# Patient Record
Sex: MALE | Race: WHITE | NOT HISPANIC OR LATINO | Employment: OTHER | URBAN - METROPOLITAN AREA
[De-identification: names, ages, dates, MRNs, and addresses within clinical notes are randomized per-mention and may not be internally consistent; named-entity substitution may affect disease eponyms.]

---

## 2024-06-13 ENCOUNTER — OFFICE VISIT (OUTPATIENT)
Dept: UROLOGY | Facility: CLINIC | Age: 69
End: 2024-06-13
Payer: COMMERCIAL

## 2024-06-13 VITALS
WEIGHT: 167 LBS | DIASTOLIC BLOOD PRESSURE: 84 MMHG | BODY MASS INDEX: 24.73 KG/M2 | HEART RATE: 53 BPM | OXYGEN SATURATION: 98 % | SYSTOLIC BLOOD PRESSURE: 149 MMHG | HEIGHT: 69 IN

## 2024-06-13 DIAGNOSIS — C61 PROSTATE CANCER (H): Primary | ICD-10-CM

## 2024-06-13 PROBLEM — N52.9 ERECTILE DYSFUNCTION: Status: ACTIVE | Noted: 2022-03-07

## 2024-06-13 PROBLEM — E78.5 HYPERLIPIDEMIA: Status: ACTIVE | Noted: 2022-03-07

## 2024-06-13 PROCEDURE — 99205 OFFICE O/P NEW HI 60 MIN: CPT | Performed by: UROLOGY

## 2024-06-13 RX ORDER — SILDENAFIL CITRATE 20 MG/1
TABLET ORAL
COMMUNITY
Start: 2023-12-05 | End: 2024-09-16

## 2024-06-13 RX ORDER — HEPARIN SODIUM 10000 [USP'U]/ML
5000 INJECTION, SOLUTION INTRAVENOUS; SUBCUTANEOUS
Status: CANCELLED | OUTPATIENT
Start: 2024-06-13

## 2024-06-13 RX ORDER — ATORVASTATIN CALCIUM 10 MG/1
10 TABLET, FILM COATED ORAL DAILY
COMMUNITY
Start: 2024-01-18 | End: 2025-01-17

## 2024-06-13 ASSESSMENT — PAIN SCALES - GENERAL: PAINLEVEL: NO PAIN (0)

## 2024-06-13 NOTE — LETTER
6/13/2024       RE: Jeff Maddox  [address]  [place] [address]     Dear Colleague,    Thank you for referring your patient, Jeff Maddox, to the [facility] at [facility]. Please see a copy of my visit note below.          Chief Complaint:   Prostate Cancer         History of Present Illness:    Jeff Maddox is a very pleasant 68 year old male who presents with a history of Prostate Cancer. He was diagnosed based on the finding of a suspicious MENDY. PSA actually quite low at 0.8. PIRADS 4 lesion on MRI and Fatuma 4+3=7 disease on biopsy. PSMA PET completed and identifies no evidence of metastasis. He presents today to discuss these findings and options for treatment. Has prescription for sildenafil, but has adequate erectile function. He notes he has no significant urinary symptoms.    PSA 0.8    Fusion Biopsy 4/30/2024  FINAL DIAGNOSIS    A.  Prostate, right apex, x 2, needle core biopsy:  Prostate tissue with no diagnostic abnormality     B.  Prostate, right middle, x 2, needle core biopsy:  Prostatic adenocarcinoma, GRADE GROUP 2, Fatuma grade 3+4 (score 7) (estimated 30 % grade 4)  1 of 2 needle cores are positive  5 % tissue involvement     C.  Prostate, right base, x 1, needle core biopsy:  Prostatic adenocarcinoma, GRADE GROUP 3, [place] grade 4+3 (score 7) (estimated 70 % grade 4)  1 of 1 needle cores are positive  30 % tissue involvement     D.  Prostate, left apex, x 2, needle core biopsy:  Atypical small acinar proliferation (ASAP)     E.  Prostate, left middle, x 2, needle core biopsy:  Prostatic adenocarcinoma, GRADE GROUP 1, Holland grade 3+3 (score 6)  1 of 2 needle cores are positive  < 5 % tissue involvement     F.  Prostate, left base, x 1, needle core biopsy:  Prostatic adenocarcinoma, GRADE GROUP 3, Fatuma grade 4+3 (score 7) (estimate 70% grade 4)  1 of 1 needle cores are positive  80 % tissue  involvement     G.  Prostate, Index Lesion x 4 Midline PZ base post, needle core biopsy:  Prostatic adenocarcinoma, GRADE GROUP 3, [place] grade 4+3 (score 7) (estimated 80 % grade 4)  4 of 4 needle cores are positive  90 % tissue involvement  Perineural invasion present     H.  Prostate, Right Diego Ves x 1, needle core biopsy:  Negative for adenocarcinoma in this sample     I.  Prostate, Left Diego Ves x 1, needle core biopsy:  Negative for adenocarcinoma in this sample     MRI Prostate 3/5/2024  PROSTATE VOLUME: 21 mL.   IMPRESSION:   1. Based on the most suspicious finding, this examination is characterized as PIRADS 4 high probability. Clinically significant cancer is likely related present. There is a 1.4 cm nodular focus of signal abnormality, enhancement, diffusion restriction along the posterior midline peripheral zone base at the inferior aspect of the seminal vesicles. This causes broad segment posterior capsular abutment and capsular bulging, but no alban nodular extraprostatic extension is identified.   2. No suspicious adenopathy or bony lesion in pelvis.     PSMA PET 5/23/2024  IMPRESSION:   1. Mild focal uptake in the prostate gland compatible with known malignancy. Relatively low level radiotracer uptake in the primary malignancy may decrease sensitivity for metastatic disease.     2. No scintigraphic evidence for extraprostatic disease.          Past Medical History:   HLD  Prostate Cancer  Erectile Dysfunction         Past Surgical History:   None         Medications     Current Outpatient Medications   Medication Sig Dispense Refill    atorvastatin (LIPITOR) 10 MG tablet Take 10 mg by mouth daily (Patient not taking: Reported on 6/13/2024)      sildenafil (REVATIO) 20 MG tablet TAKE 1 TO 5 TABLETS BY MOUTH ONCE DAILY AS NEEDED. (Patient not taking: Reported on 6/13/2024)      sildenafil (REVATIO) TABS cap/tab  (Patient not taking: Reported on 6/13/2024)       No current facility-administered  "medications for this visit.          Social History:     Social History     Socioeconomic History    Marital status:      Spouse name: Not on file    Number of children: Not on file    Years of education: Not on file    Highest education level: Not on file   Occupational History    Not on file   Tobacco Use    Smoking status: Not on file    Smokeless tobacco: Not on file   Substance and Sexual Activity    Alcohol use: Not on file    Drug use: Not on file    Sexual activity: Not on file   Other Topics Concern    Not on file   Social History Narrative    Not on file     Social Determinants of Health     Financial Resource Strain: Not on file   Food Insecurity: Not on file   Transportation Needs: Not on file   Physical Activity: Not on file   Stress: Not on file   Social Connections: Not on file   Interpersonal Safety: Not on file   Housing Stability: Not on file          Allergies:   Patient has no known allergies.         Review of Systems:  From intake questionnaire     Skin: negative  Eyes: negative  Ears/Nose/Throat: negative  Respiratory: No shortness of breath, dyspnea on exertion, cough, or hemoptysis  Cardiovascular: No chest pain or palpitations  Gastrointestinal: negative; no nausea/vomiting, constipation or diarrhea  Genitourinary: as per HPI  Musculoskeletal: negative  Neurologic: negative  Psychiatric: negative  Hematologic/Lymphatic/Immunologic: negative  Endocrine: negative         Physical Exam:     Patient is a 68 year old  male   Vitals: Blood pressure (!) 149/84, pulse 53, height 1.753 m (5' 9\"), weight 75.8 kg (167 lb), SpO2 98%.  Constitutional: Body mass index is 24.66 kg/m .  Alert, no acute distress, oriented, conversant  Eyes: no scleral icterus; extraocular muscles intact, moist conjunctivae  Respiratory: no respiratory distress, or pursed lip breathing  Cardiovascular: pulses strong and intact; no obvious jugular venous distension present  Gastrointestinal: soft, nontender, no " organomegaly or masses,   Musculoskeletal: extremities normal, no peripheral edema  Skin: no suspicious lesions or rashes  Neuro: Alert, oriented, speech and mentation normal  Psych: affect and mood normal, alert and oriented to person, place and time      Labs and Pathology:    I reviewed all applicable laboratory and pathology data and went over findings with patient  PSA 0.8  Pathology Fatuma 4+3=7      Imaging:    The following imaging exams were independently viewed and interpreted by me and discussed with patient:  MRI PIRADS 4 - 21 g prostate  PSMA PET with no metastasis      Outside and Past Medical records:    Review of prior external note(s) from - [facility] information from [employer]  reviewed  Review of the result(s) of each unique test - MRI, PSA, pathology, PSMA PET         Assessment and Plan:     68 year old male with Fatuma 4+3=7 prostate cancer. PSA 0.8, nodule on MENDY. MRI PIRADS 4. PSMA PET with no metastasis. We had an extensive discussion about the significance of localized, prostate cancer. We discussed the options for treatment of a localized prostate cancer including active surveillance, brachytherapy, external beam radiation therapy, and surgical removal. We discussed that based on his Fatuma score he would not be an ideal candidate for active surveillance.       We discussed the relative merits of robotic assisted radical prostatectomy. We also discussed the advantages and disadvantages and roles of open surgery vs. laparoscopic (and Da Kimberley assisted) surgery. The anticipated post-operative course was explained, including an anticipated 1-2 day hospital stay. Catheter will remain in place 7-10 days.      We discussed that there was no clear evidence of advantage between surgery and radiation with regard to risk of recurrence. Regarding radiation, we discussed risks including but not limited to cancer recurrence, exacerbation of voiding symptoms or hematuria, urinary  retention, incontinence, stricture of the urinary tract, induction of second malignancy, and risks of rectal symptoms or bleeding.  We discussed fact that rectal symptoms may be more common with radiation than with other treatment modalities. With radiation therapy, we also discussed the difficulties in diagnosing recurrent disease at an early stage due to variability in PSA levels and the fact that most patients are not candidates for local salvage therapy when biochemical recurrence is declared.  We also discussed the significant morbidity of post-radiation local salvage therapy in terms of perioperative complications, erectile dysfunction and urinary incontinence. With surgery, we also discussed the potential advantage over radiation therapy in that biochemical recurrence can be detected at a relatively earlier stage and that salvage radiotherapy is successful in controlling recurrent disease in a substantial proportion of patients.  We also discussed that salvage radiotherapy was associated with a considerably more favorable morbidity profile compared to local salvage therapies for recurrent disease post-radiation.     We discussed option for further discussion with radiation oncology and he has an appointment for this later this afternoon.      The risks, benefits, alternatives, and personnel involved with robotic prostatectomy were discussed in detail. Specifically, we discussed that risks include but are not limited to anesthetic complications including stroke, MI, DVT/PE, as well as risk of bleeding requiring transfusion, bowel injury, infection, lymphocele, ureteral injury, nerve injury,urine leak and other potentially unforseen complications. Also discussed the risk of bladder neck contracture and other urinary symptoms after procedure. In addition, we discussed risk long-term of urinary incontinence and erectile dysfunction following the procedure. Finally, we discussed risk for adverse pathologic  features, including positive surgical margins and potential for post-surgical radiation, hormone ablation and long-term need for PSA monitoring.  All questions were answered in detail.  A written informed consent will be finalized on the morning of the procedure.    Plan:  Schedule robotic-assisted laparoscopic radical prostatectomy with pelvic lymphadenectomy    Orders  Orders Placed This Encounter   Procedures    Case Request: ROBOTIC-ASSISTED LAPAROSCOPIC RADICAL PROSTATECTOMY WITH PELVIC LYMPHADENECTOMY     60 total minutes spent on the date of the encounter including direct interaction with the patient, performing chart review, documentation and further activities as noted above.    Rohan Billy MD  Urology  [facility]

## 2024-06-13 NOTE — NURSING NOTE
Chief Complaint   Patient presents with    Prostate Cancer    Once a night to urinate   Good flow   No leakage   No urgency to urinate   Yolanda Bran, CMA

## 2024-06-13 NOTE — PROGRESS NOTES
Chief Complaint:   Prostate Cancer         History of Present Illness:    Jeff Maddox is a very pleasant 68 year old male who presents with a history of Prostate Cancer. He was diagnosed based on the finding of a suspicious MENDY. PSA actually quite low at 0.8. PIRADS 4 lesion on MRI and Fatuma 4+3=7 disease on biopsy. PSMA PET completed and identifies no evidence of metastasis. He presents today to discuss these findings and options for treatment. Has prescription for sildenafil, but has adequate erectile function. He notes he has no significant urinary symptoms.    PSA 0.8    Fusion Biopsy 4/30/2024  FINAL DIAGNOSIS    A.  Prostate, right apex, x 2, needle core biopsy:  Prostate tissue with no diagnostic abnormality     B.  Prostate, right middle, x 2, needle core biopsy:  Prostatic adenocarcinoma, GRADE GROUP 2, Fatuma grade 3+4 (score 7) (estimated 30 % grade 4)  1 of 2 needle cores are positive  5 % tissue involvement     C.  Prostate, right base, x 1, needle core biopsy:  Prostatic adenocarcinoma, GRADE GROUP 3, Fatuma grade 4+3 (score 7) (estimated 70 % grade 4)  1 of 1 needle cores are positive  30 % tissue involvement     D.  Prostate, left apex, x 2, needle core biopsy:  Atypical small acinar proliferation (ASAP)     E.  Prostate, left middle, x 2, needle core biopsy:  Prostatic adenocarcinoma, GRADE GROUP 1, [place] grade 3+3 (score 6)  1 of 2 needle cores are positive  < 5 % tissue involvement     F.  Prostate, left base, x 1, needle core biopsy:  Prostatic adenocarcinoma, GRADE GROUP 3, [place] grade 4+3 (score 7) (estimate 70% grade 4)  1 of 1 needle cores are positive  80 % tissue involvement     G.  Prostate, Index Lesion x 4 Midline PZ base post, needle core biopsy:  Prostatic adenocarcinoma, GRADE GROUP 3, [place] grade 4+3 (score 7) (estimated 80 % grade 4)  4 of 4 needle cores are positive  90 % tissue involvement  Perineural invasion present     H.  Prostate, Right Diego Ves x 1, needle  core biopsy:  Negative for adenocarcinoma in this sample     I.  Prostate, Left Diego Ves x 1, needle core biopsy:  Negative for adenocarcinoma in this sample     MRI Prostate 3/5/2024  PROSTATE VOLUME: 21 mL.   IMPRESSION:   1. Based on the most suspicious finding, this examination is characterized as PIRADS 4 high probability. Clinically significant cancer is likely related present. There is a 1.4 cm nodular focus of signal abnormality, enhancement, diffusion restriction along the posterior midline peripheral zone base at the inferior aspect of the seminal vesicles. This causes broad segment posterior capsular abutment and capsular bulging, but no alban nodular extraprostatic extension is identified.   2. No suspicious adenopathy or bony lesion in pelvis.     PSMA PET 5/23/2024  IMPRESSION:   1. Mild focal uptake in the prostate gland compatible with known malignancy. Relatively low level radiotracer uptake in the primary malignancy may decrease sensitivity for metastatic disease.     2. No scintigraphic evidence for extraprostatic disease.          Past Medical History:   HLD  Prostate Cancer  Erectile Dysfunction         Past Surgical History:   None         Medications     Current Outpatient Medications   Medication Sig Dispense Refill    atorvastatin (LIPITOR) 10 MG tablet Take 10 mg by mouth daily (Patient not taking: Reported on 6/13/2024)      sildenafil (REVATIO) 20 MG tablet TAKE 1 TO 5 TABLETS BY MOUTH ONCE DAILY AS NEEDED. (Patient not taking: Reported on 6/13/2024)      sildenafil (REVATIO) TABS cap/tab  (Patient not taking: Reported on 6/13/2024)       No current facility-administered medications for this visit.          Social History:     Social History     Socioeconomic History    Marital status:      Spouse name: Not on file    Number of children: Not on file    Years of education: Not on file    Highest education level: Not on file   Occupational History    Not on file   Tobacco Use     "Smoking status: Not on file    Smokeless tobacco: Not on file   Substance and Sexual Activity    Alcohol use: Not on file    Drug use: Not on file    Sexual activity: Not on file   Other Topics Concern    Not on file   Social History Narrative    Not on file     Social Determinants of Health     Financial Resource Strain: Not on file   Food Insecurity: Not on file   Transportation Needs: Not on file   Physical Activity: Not on file   Stress: Not on file   Social Connections: Not on file   Interpersonal Safety: Not on file   Housing Stability: Not on file          Allergies:   Patient has no known allergies.         Review of Systems:  From intake questionnaire     Skin: negative  Eyes: negative  Ears/Nose/Throat: negative  Respiratory: No shortness of breath, dyspnea on exertion, cough, or hemoptysis  Cardiovascular: No chest pain or palpitations  Gastrointestinal: negative; no nausea/vomiting, constipation or diarrhea  Genitourinary: as per HPI  Musculoskeletal: negative  Neurologic: negative  Psychiatric: negative  Hematologic/Lymphatic/Immunologic: negative  Endocrine: negative         Physical Exam:     Patient is a 68 year old  male   Vitals: Blood pressure (!) 149/84, pulse 53, height 1.753 m (5' 9\"), weight 75.8 kg (167 lb), SpO2 98%.  Constitutional: Body mass index is 24.66 kg/m .  Alert, no acute distress, oriented, conversant  Eyes: no scleral icterus; extraocular muscles intact, moist conjunctivae  Respiratory: no respiratory distress, or pursed lip breathing  Cardiovascular: pulses strong and intact; no obvious jugular venous distension present  Gastrointestinal: soft, nontender, no organomegaly or masses,   Musculoskeletal: extremities normal, no peripheral edema  Skin: no suspicious lesions or rashes  Neuro: Alert, oriented, speech and mentation normal  Psych: affect and mood normal, alert and oriented to person, place and time      Labs and Pathology:    I reviewed all applicable laboratory and " pathology data and went over findings with patient  PSA 0.8  Pathology [place]ory 4+3=7      Imaging:    The following imaging exams were independently viewed and interpreted by me and discussed with patient:  MRI PIRADS 4 - 21 g prostate  PSMA PET with no metastasis      Outside and Past Medical records:    Review of prior external note(s) from - [facility] information from [facility]  reviewed  Review of the result(s) of each unique test - MRI, PSA, pathology, PSMA PET         Assessment and Plan:     68 year old male with [place]ory 4+3=7 prostate cancer. PSA 0.8, nodule on MENDY. MRI PIRADS 4. PSMA PET with no metastasis. We had an extensive discussion about the significance of localized, prostate cancer. We discussed the options for treatment of a localized prostate cancer including active surveillance, brachytherapy, external beam radiation therapy, and surgical removal. We discussed that based on his Emory score he would not be an ideal candidate for active surveillance.       We discussed the relative merits of robotic assisted radical prostatectomy. We also discussed the advantages and disadvantages and roles of open surgery vs. laparoscopic (and Da Kimberley assisted) surgery. The anticipated post-operative course was explained, including an anticipated 1-2 day hospital stay. Catheter will remain in place 7-10 days.      We discussed that there was no clear evidence of advantage between surgery and radiation with regard to risk of recurrence. Regarding radiation, we discussed risks including but not limited to cancer recurrence, exacerbation of voiding symptoms or hematuria, urinary retention, incontinence, stricture of the urinary tract, induction of second malignancy, and risks of rectal symptoms or bleeding.  We discussed fact that rectal symptoms may be more common with radiation than with other treatment modalities. With radiation therapy, we also discussed the difficulties in diagnosing recurrent  disease at an early stage due to variability in PSA levels and the fact that most patients are not candidates for local salvage therapy when biochemical recurrence is declared.  We also discussed the significant morbidity of post-radiation local salvage therapy in terms of perioperative complications, erectile dysfunction and urinary incontinence. With surgery, we also discussed the potential advantage over radiation therapy in that biochemical recurrence can be detected at a relatively earlier stage and that salvage radiotherapy is successful in controlling recurrent disease in a substantial proportion of patients.  We also discussed that salvage radiotherapy was associated with a considerably more favorable morbidity profile compared to local salvage therapies for recurrent disease post-radiation.     We discussed option for further discussion with radiation oncology and he has an appointment for this later this afternoon.      The risks, benefits, alternatives, and personnel involved with robotic prostatectomy were discussed in detail. Specifically, we discussed that risks include but are not limited to anesthetic complications including stroke, MI, DVT/PE, as well as risk of bleeding requiring transfusion, bowel injury, infection, lymphocele, ureteral injury, nerve injury,urine leak and other potentially unforseen complications. Also discussed the risk of bladder neck contracture and other urinary symptoms after procedure. In addition, we discussed risk long-term of urinary incontinence and erectile dysfunction following the procedure. Finally, we discussed risk for adverse pathologic features, including positive surgical margins and potential for post-surgical radiation, hormone ablation and long-term need for PSA monitoring.  All questions were answered in detail.  A written informed consent will be finalized on the morning of the procedure.    Plan:  Schedule robotic-assisted laparoscopic radical  prostatectomy with pelvic lymphadenectomy    Orders  Orders Placed This Encounter   Procedures    Case Request: ROBOTIC-ASSISTED LAPAROSCOPIC RADICAL PROSTATECTOMY WITH PELVIC LYMPHADENECTOMY     60 total minutes spent on the date of the encounter including direct interaction with the patient, performing chart review, documentation and further activities as noted above.    Rohan Billy MD  Urology  [facility]

## 2024-06-14 ENCOUNTER — TELEPHONE (OUTPATIENT)
Dept: ONCOLOGY | Facility: CLINIC | Age: 69
End: 2024-06-14
Payer: COMMERCIAL

## 2024-06-14 NOTE — TELEPHONE ENCOUNTER
0614  [facility] to sched surg  [facility]  [phone]

## 2024-06-18 ENCOUNTER — TELEPHONE (OUTPATIENT)
Dept: ONCOLOGY | Facility: CLINIC | Age: 69
End: 2024-06-18
Payer: COMMERCIAL

## 2024-06-18 NOTE — TELEPHONE ENCOUNTER
Patient is scheduled for surgery with Dr. Billy    Spoke with: pt    Date of Surgery: 0920    Location: [facility]    Informed patient they will need an adult  y    Pre op with Provider y    H&P: Scheduled with pt to sched    Additional imaging/appointments: n    Surgery packet: I mailed     Additional comments: post op made in [facility] on 6/18/2024 at 8:36 AM

## 2024-06-21 NOTE — TELEPHONE ENCOUNTER
FUTURE VISIT INFORMATION      SURGERY INFORMATION:  Date: 9/20/24  Location:  or  Surgeon:  Rohan Billy MD   Anesthesia Type:  general  Procedure: ROBOTIC-ASSISTED LAPAROSCOPIC RADICAL PROSTATECTOMY WITH PELVIC LYMPHADENECTOMY     RECORDS REQUESTED FROM:       Primary Care Provider: [employer]

## 2024-06-23 ENCOUNTER — HEALTH MAINTENANCE LETTER (OUTPATIENT)
Age: 69
End: 2024-06-23

## 2024-08-29 ENCOUNTER — CARE COORDINATION (OUTPATIENT)
Dept: UROLOGY | Facility: CLINIC | Age: 69
End: 2024-08-29
Payer: COMMERCIAL

## 2024-09-15 LAB
ABO/RH(D): NORMAL
ANTIBODY SCREEN: NEGATIVE
SPECIMEN EXPIRATION DATE: NORMAL

## 2024-09-16 ENCOUNTER — APPOINTMENT (OUTPATIENT)
Dept: LAB | Facility: CLINIC | Age: 69
End: 2024-09-16
Payer: COMMERCIAL

## 2024-09-16 ENCOUNTER — ANESTHESIA EVENT (OUTPATIENT)
Dept: SURGERY | Facility: CLINIC | Age: 69
End: 2024-09-16
Payer: COMMERCIAL

## 2024-09-16 ENCOUNTER — PRE VISIT (OUTPATIENT)
Dept: SURGERY | Facility: CLINIC | Age: 69
End: 2024-09-16

## 2024-09-16 ENCOUNTER — OFFICE VISIT (OUTPATIENT)
Dept: SURGERY | Facility: CLINIC | Age: 69
End: 2024-09-16
Payer: COMMERCIAL

## 2024-09-16 ENCOUNTER — LAB (OUTPATIENT)
Dept: LAB | Facility: CLINIC | Age: 69
End: 2024-09-16
Payer: COMMERCIAL

## 2024-09-16 VITALS
TEMPERATURE: 98.4 F | OXYGEN SATURATION: 97 % | WEIGHT: 174 LBS | DIASTOLIC BLOOD PRESSURE: 71 MMHG | BODY MASS INDEX: 25.77 KG/M2 | SYSTOLIC BLOOD PRESSURE: 112 MMHG | RESPIRATION RATE: 16 BRPM | HEART RATE: 66 BPM | HEIGHT: 69 IN

## 2024-09-16 DIAGNOSIS — C61 PROSTATE CANCER (H): ICD-10-CM

## 2024-09-16 DIAGNOSIS — Z01.818 PRE-OP EVALUATION: ICD-10-CM

## 2024-09-16 DIAGNOSIS — Z01.818 PRE-OP EVALUATION: Primary | ICD-10-CM

## 2024-09-16 LAB
ANION GAP SERPL CALCULATED.3IONS-SCNC: 10 MMOL/L (ref 7–15)
BUN SERPL-MCNC: 13.3 MG/DL (ref 8–23)
CALCIUM SERPL-MCNC: 9.2 MG/DL (ref 8.8–10.4)
CHLORIDE SERPL-SCNC: 104 MMOL/L (ref 98–107)
CREAT SERPL-MCNC: 0.63 MG/DL (ref 0.67–1.17)
EGFRCR SERPLBLD CKD-EPI 2021: >90 ML/MIN/1.73M2
ERYTHROCYTE [DISTWIDTH] IN BLOOD BY AUTOMATED COUNT: 12.4 % (ref 10–15)
GLUCOSE SERPL-MCNC: 99 MG/DL (ref 70–99)
HCO3 SERPL-SCNC: 27 MMOL/L (ref 22–29)
HCT VFR BLD AUTO: 45.2 % (ref 40–53)
HGB BLD-MCNC: 15.1 G/DL (ref 13.3–17.7)
MCH RBC QN AUTO: 31.1 PG (ref 26.5–33)
MCHC RBC AUTO-ENTMCNC: 33.4 G/DL (ref 31.5–36.5)
MCV RBC AUTO: 93 FL (ref 78–100)
PLATELET # BLD AUTO: 255 10E3/UL (ref 150–450)
POTASSIUM SERPL-SCNC: 4.3 MMOL/L (ref 3.4–5.3)
RBC # BLD AUTO: 4.86 10E6/UL (ref 4.4–5.9)
SODIUM SERPL-SCNC: 141 MMOL/L (ref 135–145)
WBC # BLD AUTO: 6.1 10E3/UL (ref 4–11)

## 2024-09-16 PROCEDURE — 85027 COMPLETE CBC AUTOMATED: CPT | Performed by: PATHOLOGY

## 2024-09-16 PROCEDURE — 86900 BLOOD TYPING SEROLOGIC ABO: CPT

## 2024-09-16 PROCEDURE — 99203 OFFICE O/P NEW LOW 30 MIN: CPT | Performed by: PHYSICIAN ASSISTANT

## 2024-09-16 PROCEDURE — 80048 BASIC METABOLIC PNL TOTAL CA: CPT | Performed by: PATHOLOGY

## 2024-09-16 PROCEDURE — 36415 COLL VENOUS BLD VENIPUNCTURE: CPT | Performed by: PATHOLOGY

## 2024-09-16 RX ORDER — CYANOCOBALAMIN (VITAMIN B-12) 500 MCG
400 LOZENGE ORAL EVERY EVENING
COMMUNITY

## 2024-09-16 RX ORDER — MULTIVIT WITH MINERALS/LUTEIN
250 TABLET ORAL EVERY MORNING
COMMUNITY

## 2024-09-16 RX ORDER — VITAMIN B COMPLEX
1 TABLET ORAL EVERY MORNING
COMMUNITY

## 2024-09-16 ASSESSMENT — PAIN SCALES - GENERAL: PAINLEVEL: NO PAIN (0)

## 2024-09-16 ASSESSMENT — ENCOUNTER SYMPTOMS: DYSRHYTHMIAS: 0

## 2024-09-16 NOTE — PATIENT INSTRUCTIONS
Name:  Jeff Maddox   MRN:  [id number]   :  1955   Today's Date:  2024         You were seen today for a pre-operative assessment in the:    Pre-operative Anesthesia Assessment Center(PAC)  [facility]  [address] [place] [id number]  phone [phone]      You will be receiving a call with location, date, arrival time and diet instructions from Preadmission Nursing at your surgical site:    -[facility]: [phone]        Anesthesia recommendations for medications:    Hold Herbal medications and Supplements for the next few days before procedure. Vitamin E    Hold Ibuprofen for 1 day before procedure.   Hold Naproxen for 4 days before procedure.     No alcohol or cannabis products for 24 hours before your procedure       Do not use Sildenafil (Revatio) for 3 days before surgery.    Please DO NOT take the following medications the day of procedure:    Vitamin C and D      OK to take Acetaminophen (Tylenol) as needed        How do I prepare myself?  - Please take 2 showers (one the night prior to surgery and one the morning of surgery) using Scrubcare or Hibiclens soap.    Use this soap only from the neck to your toes.     Leave the soap on your skin for one minute--then rinse thoroughly.      You may use your own shampoo and conditioner. No other hair products.   - Please remove all jewelry and body piercings.  - No lotions, deodorants or fragrance.  - Bring your ID and insurance card.    -If you have a Deep Brain Stimulator, a Spinal Cord Stimulator, or any implanted Neuro Device, you must bring the remote to your appointment                 For further questions regarding your surgery please call your surgeon's office.

## 2024-09-16 NOTE — H&P
Pre-Operative H & P     CC:  Preoperative exam to assess for increased cardiopulmonary risk while undergoing surgery and anesthesia.    Date of Encounter: 9/16/2024  Primary Care Physician:  Romeo Jeter     Reason for visit:   Encounter Diagnoses   Name Primary?    Pre-op evaluation Yes    Prostate cancer (H)        HPI  Jeff Maddox is a 69 year old male who presents for pre-operative H & P in preparation for  Procedure Information       Case: [id number] Date/Time: 09/20/24 0830    Procedure: ROBOTIC-ASSISTED LAPAROSCOPIC RADICAL PROSTATECTOMY WITH PELVIC LYMPHADENECTOMY (Abdomen)    Anesthesia type: General    Diagnosis: Prostate cancer (H) [C61]    Pre-op diagnosis: Prostate cancer (H) [C61]    Location:  OR [address] OR    Providers: Rohan Billy MD            Patient is being evaluated for comorbid conditions of HLD and ED.    He has a history of prostate cancer and was recently evaluated by Dr. Billy in June to discuss further treatment options. He is now scheduled for surgical intervention as above.     History is obtained from the patient and chart review. He is accompanied by his wife today.     Hx of abnormal bleeding or anti-platelet use: Denies      Past Medical History  Past Medical History:   Diagnosis Date    ED (erectile dysfunction)     HLD (hyperlipidemia)     Orchitis, epididymitis, and epididymo-orchitis, with abscess     Prostate cancer (H)        Past Surgical History  Past Surgical History:   Procedure Laterality Date    COLONOSCOPY      DENTAL SURGERY      TONSILLECTOMY      Age 5    VASECTOMY         Prior to Admission Medications  Current Outpatient Medications   Medication Sig Dispense Refill    sildenafil (REVATIO) TABS cap/tab       vitamin C (ASCORBIC ACID) 250 MG tablet Take 250 mg by mouth every morning.      Vitamin D3 (CHOLECALCIFEROL) 25 mcg (1000 units) tablet Take 25 mcg by mouth every morning.      vitamin E 400 units TABS Take 400 Units by mouth every  evening.      atorvastatin (LIPITOR) 10 MG tablet Take 10 mg by mouth daily.         Allergies  No Known Allergies    Social History  Social History     Socioeconomic History    Marital status:      Spouse name: Not on file    Number of children: Not on file    Years of education: Not on file    Highest education level: Not on file   Occupational History    Not on file   Tobacco Use    Smoking status: Never    Smokeless tobacco: Never   Substance and Sexual Activity    Alcohol use: Not Currently    Drug use: Never    Sexual activity: Not on file   Other Topics Concern    Not on file   Social History Narrative    Not on file     Social Determinants of Health     Financial Resource Strain: Not on file   Food Insecurity: Not on file   Transportation Needs: Not on file   Physical Activity: Not on file   Stress: Not on file   Social Connections: Not on file   Interpersonal Safety: Not on file   Housing Stability: Not on file       Family History  Family History   Problem Relation Age of Onset    Anesthesia Reaction No family hx of     Venous thrombosis No family hx of        Review of Systems  The complete review of systems is negative other than noted in the HPI or here.   Anesthesia Evaluation   Pt has had prior anesthetic.     No history of anesthetic complications       ROS/MED HX  ENT/Pulmonary:  - neg pulmonary ROS     Neurologic:  - neg neurologic ROS     Cardiovascular:     (+) Dyslipidemia - -   -  - -                                 No previous cardiac testing  (-) hypertension, arrhythmias and valvular problems/murmurs   METS/Exercise Tolerance: >4 METS Comment: Walking, biking, yoga   Hematologic:  - neg hematologic  ROS     Musculoskeletal:  - neg musculoskeletal ROS     GI/Hepatic:  - neg GI/hepatic ROS     Renal/Genitourinary: Comment: ED    (+)       Nephrolithiasis ,    (-) renal disease   Endo:  - neg endo ROS     Psychiatric/Substance Use: Comment: Patient reports significant anxiety related to  "medical procedures.       Infectious Disease:  - neg infectious disease ROS     Malignancy:   (+) Malignancy, History of Prostate.Prostate CA Active status post.      Other:            /71 (BP Location: Right arm, Patient Position: Sitting, Cuff Size: Adult Large)   Pulse 66   Temp 98.4  F (36.9  C) (Oral)   Resp 16   Ht 1.753 m (5' 9\")   Wt 78.9 kg (174 lb)   SpO2 97%   BMI 25.70 kg/m      Physical Exam   Constitutional: Pleasant, well-appearing, no apparent distress, and appears stated age.  Eyes: Pupils equal, round and reactive to light, extra ocular muscles intact, sclera clear, conjunctiva normal.  HENT: Normocephalic and atraumatic, oral pharynx with moist mucus membranes, good dentition. No goiter appreciated.   Respiratory: Clear to auscultation bilaterally, no crackles or wheezing.  Cardiovascular: Regular rate and rhythm, normal S1 and S2, and no murmur noted.  Carotids +2, no bruits. No edema. Palpable pulses to radial  DP and PT arteries.   GI: Normal bowel sounds, soft, non-distended, non-tender, no masses palpated, no hepatosplenomegaly.  Lymph/Hematologic: No cervical lymphadenopathy and no supraclavicular lymphadenopathy.  Genitourinary:  Deferred  Skin: Warm and dry.  No rashes on exposed skin   Musculoskeletal: Full ROM of neck. There is no redness, warmth, or swelling of the visible joints. Gross motor strength is normal.    Neurologic: Awake, alert, oriented to name, place and time. Cranial nerves II-XII are grossly intact. Gait is normal.   Neuropsychiatric: Calm, cooperative. Normal affect.       Prior Labs/Diagnostic Studies   All labs and imaging personally reviewed     EKG/ stress test - if available please see in ROS above   No results found.    CT Calcium Screening 1/16/2024  RESULTS:   Incidental Findings: None.     Left main:  0   Left anterior descending artery:  13   Circumflex artery:  0   Right coronary artery:  3   Posterior descending artery:  0   Other:  0     TOTAL " CALCIUM SCORE:  16     CALCIUM PERCENTILE SCORE:  A total calcium score of 16 is between the 0 and 25 percentile for Males between the ages of 65 and 69. A score between  is compatible with definite, at least mild atherosclerotic plaque and mild or minimal coronary artery narrowing is likely.       The patient's records and results personally reviewed by this provider.     Outside records reviewed from: Care Everywhere    LAB/DIAGNOSTIC STUDIES TODAY:  CBC, BMP, T&S    Assessment  Jeff Maddox is a 69 year old male seen as a PAC referral for risk assessment and optimization for anesthesia.    Plan/Recommendations  Pt will be optimized for the proposed procedure.  See below for details on the assessment, risk, and preoperative recommendations    NEUROLOGY  - No history of TIA, CVA or seizure    -Post Op delirium risk factors:  No risk identified    ENT  - No current airway concerns.  Will need to be reassessed day of surgery.  Mallampati: I  TM: > 3    CARDIAC  - No history of CAD, Hypertension, and Afib  - Hyperlipidemia  Managed with lifestyle modification. Patient was prescribed a statin by PCP but has not started this. He has changed his diet significantly and plans to have repeat lipids with PCP in the near future.  - METS (Metabolic Equivalents)  Patient performs 4 or more METS exercise without symptoms             Total Score: 0      RCRI-Very low risk: Class 1 0.4% complication rate             Total Score: 0        PULMONARY    ELA Low Risk             Total Score: 2    ELA: Over 50 ys old    ELA: Male      - Denies asthma or inhaler use  - Tobacco History    History   Smoking Status    Never   Smokeless Tobacco    Never       GI    PONV Medium Risk  Total Score: 2           1 AN PONV: Patient is not a current smoker    1 AN PONV: Intended Post Op Opioids        /RENAL  - Prostate Cancer  Above surgery planned for further management  - ED  Hold sildenafil 3 days before surgery    ENDOCRINE    -  "BMI: Estimated body mass index is 25.7 kg/m  as calculated from the following:    Height as of this encounter: 1.753 m (5' 9\").    Weight as of this encounter: 78.9 kg (174 lb).  Overweight (BMI 25.0-29.9)  - No history of Diabetes Mellitus    HEME  VTE Medium Risk 1.8%             Total Score: 6    VTE: Greater than 59 yrs old    VTE: Current cancer      - No history of abnormal bleeding or antiplatelet use.  - A type and screen has been ordered for this patient    MSK  Patient is NOT Frail             Total Score: 0      PSYCH  - Patient reports significant anxiety related to medical procedures. States this is secondary to a traumatic experience during tonsillectomy as a child. He is now practicing hypnosis with significant improvement. He also had an opportunity to speak with PAC anesthesiologist today.     Different anesthesia methods/types have been discussed with the patient, but they are aware that the final plan will be decided by the assigned anesthesia provider on the date of service.  Patient was discussed with Dr Cuenca    The patient is optimized for their procedure. AVS with information on surgery time/arrival time, meds and NPO status given by nursing staff. No further diagnostic testing indicated.      On the day of service:     Prep time: 6 minutes  Visit time: 20 minutes  Documentation time: 5 minutes  ------------------------------------------  Total time: 31 minutes      Megan Hermosillo PA-C  Preoperative Assessment Center  [facility]  Phone: [phone]  Fax: [phone]    "

## 2024-09-17 NOTE — PROGRESS NOTES
PTA medications updated by Medication Scribe prior to surgery via phone call with patient (last doses completed by Nurse)     Medication history sources: Patient and H&P  In the past week, patient estimated taking medication this percent of the time: Greater than 90%      Significant changes made to the medication list:  None      Additional medication history information:   None    Medication reconciliation completed by provider prior to medication history? No    Time spent in this activity: 15 MINUTES    The information provided in this note is only as accurate as the sources available at the time of update(s)      Prior to Admission medications    Medication Sig Last Dose Taking? Auth Provider Long Term End Date   sildenafil (REVATIO) TABS cap/tab Take 1-5 mg by mouth daily as needed.  Yes Reported, Patient Yes    vitamin C (ASCORBIC ACID) 250 MG tablet Take 250 mg by mouth every morning.  at AM Yes Reported, Patient     Vitamin D3 (CHOLECALCIFEROL) 25 mcg (1000 units) tablet Take 1 tablet by mouth every morning.  at AM Yes Reported, Patient     vitamin E 400 units TABS Take 400 Units by mouth every evening.  at PM Yes Reported, Patient     atorvastatin (LIPITOR) 10 MG tablet Take 10 mg by mouth daily.   Reported, Patient Yes 1/17/25       Medication history completed by: Farideh Brown

## 2024-09-20 ENCOUNTER — HOSPITAL ENCOUNTER (OUTPATIENT)
Facility: CLINIC | Age: 69
Discharge: HOME | End: 2024-09-21
Attending: UROLOGY | Admitting: UROLOGY
Payer: COMMERCIAL

## 2024-09-20 ENCOUNTER — ANESTHESIA (OUTPATIENT)
Dept: SURGERY | Facility: CLINIC | Age: 69
End: 2024-09-20
Payer: COMMERCIAL

## 2024-09-20 DIAGNOSIS — C61 PROSTATE CANCER (H): Primary | ICD-10-CM

## 2024-09-20 PROBLEM — D49.59 PROSTATE NEOPLASM: Status: ACTIVE | Noted: 2024-09-20

## 2024-09-20 LAB
ABO/RH(D): NORMAL
ANTIBODY SCREEN: NEGATIVE
FASTING STATUS PATIENT QL REPORTED: YES
GLUCOSE SERPL-MCNC: 104 MG/DL (ref 70–99)
SPECIMEN EXPIRATION DATE: NORMAL

## 2024-09-20 PROCEDURE — 250N000011 HC RX IP 250 OP 636: Performed by: NURSE ANESTHETIST, CERTIFIED REGISTERED

## 2024-09-20 PROCEDURE — 250N000009 HC RX 250: Performed by: NURSE ANESTHETIST, CERTIFIED REGISTERED

## 2024-09-20 PROCEDURE — 250N000011 HC RX IP 250 OP 636: Performed by: UROLOGY

## 2024-09-20 PROCEDURE — 86900 BLOOD TYPING SEROLOGIC ABO: CPT | Performed by: UROLOGY

## 2024-09-20 PROCEDURE — 250N000009 HC RX 250: Performed by: UROLOGY

## 2024-09-20 PROCEDURE — 272N000001 HC OR GENERAL SUPPLY STERILE: Performed by: UROLOGY

## 2024-09-20 PROCEDURE — 38571 LAPAROSCOPY LYMPHADENECTOMY: CPT | Mod: 51 | Performed by: UROLOGY

## 2024-09-20 PROCEDURE — 999N000141 HC STATISTIC PRE-PROCEDURE NURSING ASSESSMENT: Performed by: UROLOGY

## 2024-09-20 PROCEDURE — 55866 LAPS SURG PRST8ECT RPBIC RAD: CPT | Performed by: NURSE ANESTHETIST, CERTIFIED REGISTERED

## 2024-09-20 PROCEDURE — 88309 TISSUE EXAM BY PATHOLOGIST: CPT | Mod: TC | Performed by: UROLOGY

## 2024-09-20 PROCEDURE — 360N000080 HC SURGERY LEVEL 7, PER MIN: Performed by: UROLOGY

## 2024-09-20 PROCEDURE — 250N000013 HC RX MED GY IP 250 OP 250 PS 637: Performed by: UROLOGY

## 2024-09-20 PROCEDURE — 82947 ASSAY GLUCOSE BLOOD QUANT: CPT | Performed by: ANESTHESIOLOGY

## 2024-09-20 PROCEDURE — 55866 LAPS SURG PRST8ECT RPBIC RAD: CPT | Performed by: UROLOGY

## 2024-09-20 PROCEDURE — 710N000009 HC RECOVERY PHASE 1, LEVEL 1, PER MIN: Performed by: UROLOGY

## 2024-09-20 PROCEDURE — 258N000003 HC RX IP 258 OP 636: Performed by: ANESTHESIOLOGY

## 2024-09-20 PROCEDURE — 55866 LAPS SURG PRST8ECT RPBIC RAD: CPT | Performed by: ANESTHESIOLOGY

## 2024-09-20 PROCEDURE — 258N000001 HC RX 258: Performed by: UROLOGY

## 2024-09-20 PROCEDURE — 370N000017 HC ANESTHESIA TECHNICAL FEE, PER MIN: Performed by: UROLOGY

## 2024-09-20 PROCEDURE — 250N000025 HC SEVOFLURANE, PER MIN: Performed by: UROLOGY

## 2024-09-20 PROCEDURE — 36415 COLL VENOUS BLD VENIPUNCTURE: CPT | Performed by: UROLOGY

## 2024-09-20 RX ORDER — ONDANSETRON 2 MG/ML
4 INJECTION INTRAMUSCULAR; INTRAVENOUS EVERY 30 MIN PRN
Status: DISCONTINUED | OUTPATIENT
Start: 2024-09-20 | End: 2024-09-20 | Stop reason: HOSPADM

## 2024-09-20 RX ORDER — NALOXONE HYDROCHLORIDE 0.4 MG/ML
0.1 INJECTION, SOLUTION INTRAMUSCULAR; INTRAVENOUS; SUBCUTANEOUS
Status: DISCONTINUED | OUTPATIENT
Start: 2024-09-20 | End: 2024-09-20 | Stop reason: HOSPADM

## 2024-09-20 RX ORDER — DEXMEDETOMIDINE HYDROCHLORIDE 4 UG/ML
INJECTION, SOLUTION INTRAVENOUS PRN
Status: DISCONTINUED | OUTPATIENT
Start: 2024-09-20 | End: 2024-09-20

## 2024-09-20 RX ORDER — POLYETHYLENE GLYCOL 3350 17 G/17G
17 POWDER, FOR SOLUTION ORAL DAILY
Status: DISCONTINUED | OUTPATIENT
Start: 2024-09-21 | End: 2024-09-21 | Stop reason: HOSPADM

## 2024-09-20 RX ORDER — FENTANYL CITRATE 50 UG/ML
INJECTION, SOLUTION INTRAMUSCULAR; INTRAVENOUS PRN
Status: DISCONTINUED | OUTPATIENT
Start: 2024-09-20 | End: 2024-09-20

## 2024-09-20 RX ORDER — LIDOCAINE 40 MG/G
CREAM TOPICAL
Status: DISCONTINUED | OUTPATIENT
Start: 2024-09-20 | End: 2024-09-21 | Stop reason: HOSPADM

## 2024-09-20 RX ORDER — BISACODYL 10 MG
10 SUPPOSITORY, RECTAL RECTAL DAILY PRN
Status: DISCONTINUED | OUTPATIENT
Start: 2024-09-23 | End: 2024-09-21 | Stop reason: HOSPADM

## 2024-09-20 RX ORDER — OXYCODONE HYDROCHLORIDE 5 MG/1
5 TABLET ORAL EVERY 4 HOURS PRN
Status: DISCONTINUED | OUTPATIENT
Start: 2024-09-20 | End: 2024-09-21 | Stop reason: HOSPADM

## 2024-09-20 RX ORDER — ACETAMINOPHEN 325 MG/1
975 TABLET ORAL EVERY 8 HOURS
Status: DISCONTINUED | OUTPATIENT
Start: 2024-09-20 | End: 2024-09-21 | Stop reason: HOSPADM

## 2024-09-20 RX ORDER — FAMOTIDINE 20 MG/1
20 TABLET, FILM COATED ORAL 2 TIMES DAILY PRN
Status: DISCONTINUED | OUTPATIENT
Start: 2024-09-20 | End: 2024-09-21 | Stop reason: HOSPADM

## 2024-09-20 RX ORDER — DEXAMETHASONE SODIUM PHOSPHATE 4 MG/ML
INJECTION, SOLUTION INTRA-ARTICULAR; INTRALESIONAL; INTRAMUSCULAR; INTRAVENOUS; SOFT TISSUE PRN
Status: DISCONTINUED | OUTPATIENT
Start: 2024-09-20 | End: 2024-09-20

## 2024-09-20 RX ORDER — SODIUM CHLORIDE, SODIUM LACTATE, POTASSIUM CHLORIDE, CALCIUM CHLORIDE 600; 310; 30; 20 MG/100ML; MG/100ML; MG/100ML; MG/100ML
INJECTION, SOLUTION INTRAVENOUS CONTINUOUS
Status: DISCONTINUED | OUTPATIENT
Start: 2024-09-20 | End: 2024-09-20 | Stop reason: HOSPADM

## 2024-09-20 RX ORDER — SODIUM CHLORIDE, SODIUM LACTATE, POTASSIUM CHLORIDE, CALCIUM CHLORIDE 600; 310; 30; 20 MG/100ML; MG/100ML; MG/100ML; MG/100ML
INJECTION, SOLUTION INTRAVENOUS CONTINUOUS
Status: ACTIVE | OUTPATIENT
Start: 2024-09-20 | End: 2024-09-21

## 2024-09-20 RX ORDER — OXYCODONE HYDROCHLORIDE 5 MG/1
10 TABLET ORAL EVERY 4 HOURS PRN
Status: DISCONTINUED | OUTPATIENT
Start: 2024-09-20 | End: 2024-09-21 | Stop reason: HOSPADM

## 2024-09-20 RX ORDER — FENTANYL CITRATE 50 UG/ML
25 INJECTION, SOLUTION INTRAMUSCULAR; INTRAVENOUS EVERY 5 MIN PRN
Status: DISCONTINUED | OUTPATIENT
Start: 2024-09-20 | End: 2024-09-20 | Stop reason: HOSPADM

## 2024-09-20 RX ORDER — GINSENG 100 MG
CAPSULE ORAL 3 TIMES DAILY
Status: DISCONTINUED | OUTPATIENT
Start: 2024-09-20 | End: 2024-09-21 | Stop reason: HOSPADM

## 2024-09-20 RX ORDER — PROPOFOL 10 MG/ML
INJECTION, EMULSION INTRAVENOUS PRN
Status: DISCONTINUED | OUTPATIENT
Start: 2024-09-20 | End: 2024-09-20

## 2024-09-20 RX ORDER — ONDANSETRON 2 MG/ML
4 INJECTION INTRAMUSCULAR; INTRAVENOUS EVERY 6 HOURS PRN
Status: DISCONTINUED | OUTPATIENT
Start: 2024-09-20 | End: 2024-09-21 | Stop reason: HOSPADM

## 2024-09-20 RX ORDER — NALOXONE HYDROCHLORIDE 0.4 MG/ML
0.2 INJECTION, SOLUTION INTRAMUSCULAR; INTRAVENOUS; SUBCUTANEOUS
Status: DISCONTINUED | OUTPATIENT
Start: 2024-09-20 | End: 2024-09-21 | Stop reason: HOSPADM

## 2024-09-20 RX ORDER — PROCHLORPERAZINE MALEATE 5 MG
5 TABLET ORAL EVERY 6 HOURS PRN
Status: DISCONTINUED | OUTPATIENT
Start: 2024-09-20 | End: 2024-09-21 | Stop reason: HOSPADM

## 2024-09-20 RX ORDER — ONDANSETRON 4 MG/1
4 TABLET, ORALLY DISINTEGRATING ORAL EVERY 30 MIN PRN
Status: DISCONTINUED | OUTPATIENT
Start: 2024-09-20 | End: 2024-09-20 | Stop reason: HOSPADM

## 2024-09-20 RX ORDER — LABETALOL HYDROCHLORIDE 5 MG/ML
20 INJECTION, SOLUTION INTRAVENOUS EVERY 4 HOURS PRN
Status: DISCONTINUED | OUTPATIENT
Start: 2024-09-20 | End: 2024-09-21 | Stop reason: HOSPADM

## 2024-09-20 RX ORDER — HYDROMORPHONE HCL IN WATER/PF 6 MG/30 ML
0.2 PATIENT CONTROLLED ANALGESIA SYRINGE INTRAVENOUS
Status: DISCONTINUED | OUTPATIENT
Start: 2024-09-20 | End: 2024-09-21 | Stop reason: HOSPADM

## 2024-09-20 RX ORDER — NALOXONE HYDROCHLORIDE 0.4 MG/ML
0.4 INJECTION, SOLUTION INTRAMUSCULAR; INTRAVENOUS; SUBCUTANEOUS
Status: DISCONTINUED | OUTPATIENT
Start: 2024-09-20 | End: 2024-09-21 | Stop reason: HOSPADM

## 2024-09-20 RX ORDER — ONDANSETRON 4 MG/1
4 TABLET, ORALLY DISINTEGRATING ORAL EVERY 6 HOURS PRN
Status: DISCONTINUED | OUTPATIENT
Start: 2024-09-20 | End: 2024-09-21 | Stop reason: HOSPADM

## 2024-09-20 RX ORDER — CEFAZOLIN SODIUM/WATER 2 G/20 ML
2 SYRINGE (ML) INTRAVENOUS SEE ADMIN INSTRUCTIONS
Status: DISCONTINUED | OUTPATIENT
Start: 2024-09-20 | End: 2024-09-20 | Stop reason: HOSPADM

## 2024-09-20 RX ORDER — TOLTERODINE 2 MG/1
4 CAPSULE, EXTENDED RELEASE ORAL DAILY
Status: DISCONTINUED | OUTPATIENT
Start: 2024-09-20 | End: 2024-09-21 | Stop reason: HOSPADM

## 2024-09-20 RX ORDER — KETOROLAC TROMETHAMINE 15 MG/ML
15 INJECTION, SOLUTION INTRAMUSCULAR; INTRAVENOUS EVERY 6 HOURS
Status: COMPLETED | OUTPATIENT
Start: 2024-09-20 | End: 2024-09-21

## 2024-09-20 RX ORDER — HYDROMORPHONE HCL IN WATER/PF 6 MG/30 ML
0.2 PATIENT CONTROLLED ANALGESIA SYRINGE INTRAVENOUS EVERY 5 MIN PRN
Status: DISCONTINUED | OUTPATIENT
Start: 2024-09-20 | End: 2024-09-20 | Stop reason: HOSPADM

## 2024-09-20 RX ORDER — HYDROMORPHONE HCL IN WATER/PF 6 MG/30 ML
0.4 PATIENT CONTROLLED ANALGESIA SYRINGE INTRAVENOUS EVERY 5 MIN PRN
Status: DISCONTINUED | OUTPATIENT
Start: 2024-09-20 | End: 2024-09-20 | Stop reason: HOSPADM

## 2024-09-20 RX ORDER — FENTANYL CITRATE 50 UG/ML
50 INJECTION, SOLUTION INTRAMUSCULAR; INTRAVENOUS EVERY 5 MIN PRN
Status: DISCONTINUED | OUTPATIENT
Start: 2024-09-20 | End: 2024-09-20 | Stop reason: HOSPADM

## 2024-09-20 RX ORDER — METHOCARBAMOL 500 MG/1
500 TABLET, FILM COATED ORAL EVERY 6 HOURS PRN
Status: DISCONTINUED | OUTPATIENT
Start: 2024-09-20 | End: 2024-09-21 | Stop reason: HOSPADM

## 2024-09-20 RX ORDER — ONDANSETRON 2 MG/ML
INJECTION INTRAMUSCULAR; INTRAVENOUS PRN
Status: DISCONTINUED | OUTPATIENT
Start: 2024-09-20 | End: 2024-09-20

## 2024-09-20 RX ORDER — VITAMIN B COMPLEX
25 TABLET ORAL EVERY MORNING
Status: DISCONTINUED | OUTPATIENT
Start: 2024-09-21 | End: 2024-09-21 | Stop reason: HOSPADM

## 2024-09-20 RX ORDER — AMOXICILLIN 250 MG
1 CAPSULE ORAL 2 TIMES DAILY
Status: DISCONTINUED | OUTPATIENT
Start: 2024-09-20 | End: 2024-09-21 | Stop reason: HOSPADM

## 2024-09-20 RX ORDER — LIDOCAINE HYDROCHLORIDE 20 MG/ML
INJECTION, SOLUTION INFILTRATION; PERINEURAL PRN
Status: DISCONTINUED | OUTPATIENT
Start: 2024-09-20 | End: 2024-09-20

## 2024-09-20 RX ORDER — HYDROXYZINE HYDROCHLORIDE 10 MG/1
10 TABLET, FILM COATED ORAL EVERY 6 HOURS PRN
Status: DISCONTINUED | OUTPATIENT
Start: 2024-09-20 | End: 2024-09-21 | Stop reason: HOSPADM

## 2024-09-20 RX ORDER — HYDROMORPHONE HCL IN WATER/PF 6 MG/30 ML
0.4 PATIENT CONTROLLED ANALGESIA SYRINGE INTRAVENOUS
Status: DISCONTINUED | OUTPATIENT
Start: 2024-09-20 | End: 2024-09-21 | Stop reason: HOSPADM

## 2024-09-20 RX ORDER — BUPIVACAINE HYDROCHLORIDE 2.5 MG/ML
INJECTION, SOLUTION INFILTRATION; PERINEURAL PRN
Status: DISCONTINUED | OUTPATIENT
Start: 2024-09-20 | End: 2024-09-20 | Stop reason: HOSPADM

## 2024-09-20 RX ORDER — HEPARIN SODIUM 5000 [USP'U]/.5ML
5000 INJECTION, SOLUTION INTRAVENOUS; SUBCUTANEOUS
Status: COMPLETED | OUTPATIENT
Start: 2024-09-20 | End: 2024-09-20

## 2024-09-20 RX ORDER — CEFAZOLIN SODIUM/WATER 2 G/20 ML
2 SYRINGE (ML) INTRAVENOUS
Status: COMPLETED | OUTPATIENT
Start: 2024-09-20 | End: 2024-09-20

## 2024-09-20 RX ORDER — ACETAMINOPHEN 325 MG/1
650 TABLET ORAL EVERY 4 HOURS PRN
Status: DISCONTINUED | OUTPATIENT
Start: 2024-09-23 | End: 2024-09-21 | Stop reason: HOSPADM

## 2024-09-20 RX ORDER — DEXAMETHASONE SODIUM PHOSPHATE 4 MG/ML
4 INJECTION, SOLUTION INTRA-ARTICULAR; INTRALESIONAL; INTRAMUSCULAR; INTRAVENOUS; SOFT TISSUE
Status: DISCONTINUED | OUTPATIENT
Start: 2024-09-20 | End: 2024-09-20 | Stop reason: HOSPADM

## 2024-09-20 RX ADMIN — SENNOSIDES AND DOCUSATE SODIUM 1 TABLET: 50; 8.6 TABLET ORAL at 21:22

## 2024-09-20 RX ADMIN — ONDANSETRON 4 MG: 2 INJECTION INTRAMUSCULAR; INTRAVENOUS at 12:15

## 2024-09-20 RX ADMIN — LIDOCAINE HYDROCHLORIDE 100 MG: 20 INJECTION, SOLUTION INFILTRATION; PERINEURAL at 08:39

## 2024-09-20 RX ADMIN — BACITRACIN: 500 OINTMENT TOPICAL at 15:29

## 2024-09-20 RX ADMIN — HEPARIN SODIUM 5000 UNITS: 5000 INJECTION, SOLUTION INTRAVENOUS; SUBCUTANEOUS at 08:07

## 2024-09-20 RX ADMIN — ROCURONIUM BROMIDE 50 MG: 50 INJECTION, SOLUTION INTRAVENOUS at 08:40

## 2024-09-20 RX ADMIN — TOLTERODINE TARTRATE 4 MG: 2 CAPSULE, EXTENDED RELEASE ORAL at 15:28

## 2024-09-20 RX ADMIN — BACITRACIN: 500 OINTMENT TOPICAL at 21:22

## 2024-09-20 RX ADMIN — PROPOFOL 200 MG: 10 INJECTION, EMULSION INTRAVENOUS at 08:39

## 2024-09-20 RX ADMIN — KETOROLAC TROMETHAMINE 15 MG: 15 INJECTION, SOLUTION INTRAMUSCULAR; INTRAVENOUS at 21:21

## 2024-09-20 RX ADMIN — SODIUM CHLORIDE, POTASSIUM CHLORIDE, SODIUM LACTATE AND CALCIUM CHLORIDE: 600; 310; 30; 20 INJECTION, SOLUTION INTRAVENOUS at 10:49

## 2024-09-20 RX ADMIN — MIDAZOLAM 2 MG: 1 INJECTION INTRAMUSCULAR; INTRAVENOUS at 08:30

## 2024-09-20 RX ADMIN — HYDROMORPHONE HYDROCHLORIDE 0.5 MG: 1 INJECTION, SOLUTION INTRAMUSCULAR; INTRAVENOUS; SUBCUTANEOUS at 10:48

## 2024-09-20 RX ADMIN — ROCURONIUM BROMIDE 20 MG: 50 INJECTION, SOLUTION INTRAVENOUS at 09:46

## 2024-09-20 RX ADMIN — FENTANYL CITRATE 50 MCG: 50 INJECTION INTRAMUSCULAR; INTRAVENOUS at 09:27

## 2024-09-20 RX ADMIN — SUGAMMADEX 200 MG: 100 INJECTION, SOLUTION INTRAVENOUS at 12:28

## 2024-09-20 RX ADMIN — Medication 2 G: at 12:33

## 2024-09-20 RX ADMIN — DEXMEDETOMIDINE HYDROCHLORIDE 10 MCG: 200 INJECTION INTRAVENOUS at 12:28

## 2024-09-20 RX ADMIN — FENTANYL CITRATE 25 MCG: 50 INJECTION INTRAMUSCULAR; INTRAVENOUS at 08:39

## 2024-09-20 RX ADMIN — DEXAMETHASONE SODIUM PHOSPHATE 4 MG: 4 INJECTION, SOLUTION INTRA-ARTICULAR; INTRALESIONAL; INTRAMUSCULAR; INTRAVENOUS; SOFT TISSUE at 08:54

## 2024-09-20 RX ADMIN — SODIUM CHLORIDE, POTASSIUM CHLORIDE, SODIUM LACTATE AND CALCIUM CHLORIDE: 600; 310; 30; 20 INJECTION, SOLUTION INTRAVENOUS at 08:08

## 2024-09-20 RX ADMIN — FENTANYL CITRATE 25 MCG: 50 INJECTION INTRAMUSCULAR; INTRAVENOUS at 09:07

## 2024-09-20 RX ADMIN — ACETAMINOPHEN 975 MG: 325 TABLET ORAL at 15:29

## 2024-09-20 RX ADMIN — KETOROLAC TROMETHAMINE 15 MG: 15 INJECTION, SOLUTION INTRAMUSCULAR; INTRAVENOUS at 15:29

## 2024-09-20 RX ADMIN — ACETAMINOPHEN 975 MG: 325 TABLET ORAL at 22:08

## 2024-09-20 RX ADMIN — Medication 2 G: at 08:36

## 2024-09-20 RX ADMIN — HYDROMORPHONE HYDROCHLORIDE 0.2 MG: 0.2 INJECTION, SOLUTION INTRAMUSCULAR; INTRAVENOUS; SUBCUTANEOUS at 17:37

## 2024-09-20 RX ADMIN — ROCURONIUM BROMIDE 20 MG: 50 INJECTION, SOLUTION INTRAVENOUS at 10:30

## 2024-09-20 RX ADMIN — DEXMEDETOMIDINE HYDROCHLORIDE 10 MCG: 200 INJECTION INTRAVENOUS at 12:15

## 2024-09-20 RX ADMIN — ROCURONIUM BROMIDE 10 MG: 50 INJECTION, SOLUTION INTRAVENOUS at 11:18

## 2024-09-20 ASSESSMENT — ACTIVITIES OF DAILY LIVING (ADL)
ADLS_ACUITY_SCORE: 20
ADLS_ACUITY_SCORE: 18
ADLS_ACUITY_SCORE: 18
ADLS_ACUITY_SCORE: 20
ADLS_ACUITY_SCORE: 19
ADLS_ACUITY_SCORE: 18
ADLS_ACUITY_SCORE: 19
ADLS_ACUITY_SCORE: 18
ADLS_ACUITY_SCORE: 33
ADLS_ACUITY_SCORE: 18

## 2024-09-20 NOTE — BRIEF OP NOTE
[facility]    Brief Operative Note    Pre-operative diagnosis: Prostate cancer (H) [C61]  Post-operative diagnosis Same as pre-operative diagnosis    Procedure: ROBOTIC-ASSISTED LAPAROSCOPIC RADICAL PROSTATECTOMY WITH PELVIC LYMPHADENECTOMY, N/A - Abdomen    Surgeon: Surgeons and Role:     * Rohan Billy MD - Primary     * Nathan Genao MD - Resident - Assisting  Anesthesia: General   Estimated Blood Loss: Less than 100 ml    Drains: Gareth-Ramos and Almonte  Specimens:   ID Type Source Tests Collected by Time Destination   1 : Anterior Bladder Neck Margin Tissue Urinary Bladder, Neck Margin SURGICAL PATHOLOGY EXAM Rohan Billy MD 9/20/2024 10:39 AM    2 : Anterior Bladder Neck Margin Number 2 Tissue Urinary Bladder, Neck Margin SURGICAL PATHOLOGY EXAM Rohan Billy MD 9/20/2024 11:16 AM    3 : Posterior Bladder Neck Margin Tissue Urinary Bladder, Neck Margin SURGICAL PATHOLOGY EXAM Rohan Billy MD 9/20/2024 11:19 AM    4 : Anterior Bladder Neck Margin Tissue Urinary Bladder, Neck Margin SURGICAL PATHOLOGY EXAM Rohan Billy MD 9/20/2024 11:20 AM    5 : PROSTATE AND SEMINAL VESICLES Tissue Prostate SURGICAL PATHOLOGY EXAM Rohan Billy MD 9/20/2024 11:41 AM    6 : BILATERAL PELVIC LYMPH NODES Tissue Lymph Node(s) SURGICAL PATHOLOGY EXAM Rohan Billy MD 9/20/2024 11:42 AM      Findings:   None.  Complications: None.  Implants: * No implants in log *      - Overnight stay, RENATA out in AM if outputs low, almonte 1-2 weeks    Shaun Haile MD  Urology PGY-5

## 2024-09-20 NOTE — ANESTHESIA PREPROCEDURE EVALUATION
Anesthesia Pre-Procedure Evaluation    Patient: Jeff Maddox   MRN: [id number] : 1955        Procedure : Procedure(s):  ROBOTIC-ASSISTED LAPAROSCOPIC RADICAL PROSTATECTOMY WITH PELVIC LYMPHADENECTOMY          Past Medical History:   Diagnosis Date    ED (erectile dysfunction)     HLD (hyperlipidemia)     Orchitis, epididymitis, and epididymo-orchitis, with abscess     Prostate cancer (H)       Past Surgical History:   Procedure Laterality Date    COLONOSCOPY      DENTAL SURGERY      TONSILLECTOMY      Age 5    VASECTOMY        No Known Allergies   Social History     Tobacco Use    Smoking status: Never    Smokeless tobacco: Never   Substance Use Topics    Alcohol use: Not Currently      Wt Readings from Last 1 Encounters:   24 78.9 kg (174 lb)        Anesthesia Evaluation            ROS/MED HX  ENT/Pulmonary:  - neg pulmonary ROS     Neurologic:  - neg neurologic ROS     Cardiovascular:     (+) Dyslipidemia - -   -  - -                                   (-) CAD and stent   METS/Exercise Tolerance: >4 METS    Hematologic:  - neg hematologic  ROS     Musculoskeletal:  - neg musculoskeletal ROS     GI/Hepatic:  - neg GI/hepatic ROS     Renal/Genitourinary: Comment: Prostate Ca      Endo:  - neg endo ROS     Psychiatric/Substance Use:  - neg psychiatric ROS     Infectious Disease:       Malignancy:   (+) Malignancy, History of Prostate.Prostate CA Active status post.      Other:            Physical Exam    Airway        Mallampati: III   TM distance: > 3 FB   Neck ROM: full   Mouth opening: > 3 cm    Respiratory Devices and Support         Dental       (+) Minor Abnormalities - some fillings, tiny chips      Cardiovascular   cardiovascular exam normal          Pulmonary   pulmonary exam normal                OUTSIDE LABS:  CBC:   Lab Results   Component Value Date    WBC 6.1 2024    HGB 15.1 2024    HCT 45.2 2024     2024     BMP:   Lab Results   Component Value Date    NA  "141 09/16/2024    POTASSIUM 4.3 09/16/2024    CHLORIDE 104 09/16/2024    CO2 27 09/16/2024    BUN 13.3 09/16/2024    CR 0.63 (L) 09/16/2024    GLC 99 09/16/2024     COAGS: No results found for: \"PTT\", \"INR\", \"FIBR\"  POC: No results found for: \"BGM\", \"HCG\", \"HCGS\"  HEPATIC: No results found for: \"ALBUMIN\", \"PROTTOTAL\", \"ALT\", \"AST\", \"GGT\", \"ALKPHOS\", \"BILITOTAL\", \"BILIDIRECT\", \"ALEKSANDER\"  OTHER:   Lab Results   Component Value Date    JERI 9.2 09/16/2024       Anesthesia Plan    ASA Status:  2    NPO Status:  NPO Appropriate    Anesthesia Type: General.     - Airway: ETT   Induction: Intravenous, Propofol.   Maintenance: Balanced.        Consents    Anesthesia Plan(s) and associated risks, benefits, and realistic alternatives discussed. Questions answered and patient/representative(s) expressed understanding.     - Discussed:     - Discussed with:  Patient      - Extended Intubation/Ventilatory Support Discussed: No.      - Patient is DNR/DNI Status: No     Use of blood products discussed: No .     Postoperative Care    Pain management: IV analgesics, Multi-modal analgesia.   PONV prophylaxis: Ondansetron (or other 5HT-3), Dexamethasone or Solumedrol     Comments:               Gautam Frederick MD    I have reviewed the pertinent notes and labs in the chart from the past 30 days and (re)examined the patient.  Any updates or changes from those notes are reflected in this note.              # Overweight: Estimated body mass index is 25.7 kg/m  as calculated from the following:    Height as of 9/16/24: 1.753 m (5' 9\").    Weight as of 9/16/24: 78.9 kg (174 lb).      "

## 2024-09-20 NOTE — ANESTHESIA PROCEDURE NOTES
Airway       Patient location during procedure: OR       Procedure Start/Stop Times: 9/20/2024 8:42 AM  Staff -        Anesthesiologist:  Gautam Frederick MD       CRNA: Alessia Amin APRN CRNA       Performed By: CRNA  Consent for Airway        Urgency: elective  Indications and Patient Condition       Indications for airway management: mackenzie-procedural       Induction type:intravenous       Mask difficulty assessment: 2 - vent by mask + OA or adjuvant +/- NMBA    Final Airway Details       Final airway type: endotracheal airway       Successful airway: ETT - single  Endotracheal Airway Details        ETT size (mm): 8.0       Cuffed: yes       Successful intubation technique: direct laryngoscopy       DL Blade Type: Amin 2       Grade View of Cords: 1       Adjucts: stylet       Position: Right       Measured from: lips       Secured at (cm): 22       Bite block used: None    Post intubation assessment        Placement verified by: capnometry, equal breath sounds and chest rise        Number of attempts at approach: 1       Secured with: tape       Ease of procedure: easy       Dentition: Intact and Unchanged    Medication(s) Administered   Medication Administration Time: 9/20/2024 8:42 AM

## 2024-09-20 NOTE — PLAN OF CARE
Goal Outcome Evaluation:    Date & Time: 9/20/24 2926-9805  Surgery/POD#: Day of Surgery  Behavior & Aggression: green  Fall Risk: yes  Orientation:A&OX4  ABNL VS/O2:VSS, oxygen 2 L NC  Pain Management:pain controlled with tylenol, toradal and IV dilaudid.   Bowel/Bladder: Hypoactive BS, no nausea, no flatus.  Mendoza  patent with spencer colored urine.   Drains: RENATA with bloody output.   Wounds/incisions:Abd incisions CDI.  Diet:Tolerating clear liquid diet without nausea.   Activity Level: dangled on side of bed with assist of 1  Anticipated  DC Date: ?  9/21  Significant Information: Pt to room 2219 via cart from PACU at 1436.  A&OX4,, VSS.  Wife here. Oriented to room and POC.

## 2024-09-20 NOTE — OP NOTE
OPERATIVE REPORT  DATE OF PROCEDURE: 9/20/2024  PRE-PROCEDURE DIAGNOSIS: Prostate cancer.   POST-PROCEDURE DIAGNOSIS: Prostate cancer.   PROCEDURES PERFORMED:   1) Robotic-assisted laparoscopic radical prostatectomy  2) Bilateral pelvic lymphadenectomy  SURGEON: Rohan Billy MD - Present for the entire procedure  ASSISTANT: Shaun Haile MD; Nathan Genao MD  ANESTHESIA: General with endotracheal intubation.   INDICATIONS FOR PROCEDURE: Jeff Maddox is a 69 year-old gentleman with a history of Fatuma 4+3 = 7 adenocarcinoma of the prostate. He has been counseled regarding treatment options and presents to undergo surgery.   Findings: Prostate and seminal vesicles removed without complication. Lymph nodes grossly negative. Water-tight anastomosis.  Specimens:    ID Type Source Tests Collected by Time Destination   1 : Anterior Bladder Neck Margin Tissue Urinary Bladder, Neck Margin SURGICAL PATHOLOGY EXAM Rohan Billy MD 9/20/2024 10:39 AM    2 : Anterior Bladder Neck Margin Number 2 Tissue Urinary Bladder, Neck Margin SURGICAL PATHOLOGY EXAM Rohan Billy MD 9/20/2024 11:16 AM    3 : Posterior Bladder Neck Margin Tissue Urinary Bladder, Neck Margin SURGICAL PATHOLOGY EXAM Rohan Billy MD 9/20/2024 11:19 AM    4 : Anterior Bladder Neck Margin Tissue Urinary Bladder, Neck Margin SURGICAL PATHOLOGY EXAM Rohan Billy MD 9/20/2024 11:20 AM    5 : PROSTATE AND SEMINAL VESICLES Tissue Prostate SURGICAL PATHOLOGY EXAM Rohan Billy MD 9/20/2024 11:41 AM    6 : BILATERAL PELVIC LYMPH NODES Tissue Lymph Node(s) SURGICAL PATHOLOGY EXAM Rohan Billy MD 9/20/2024 11:42 AM      DESCRIPTION OF PROCEDURE: After fully informed voluntary consent was obtained, the patient was brought into the operating room, properly identified and placed in a supine position on the table. General anesthesia was induced, endotracheal intubation  performed, IV Ancef administered. The patient was then positioned appropriately on the table and all pressure points were padded and he was secured to the table with tape. Once the positioning was performed, we proceeded with shaving, prepping and draping the abdomen in the usual sterile fashion. Mendoza was placed in the bladder in a sterile manner on the field. A transverse midline incision about 2 cm above the umbilicus was made and a Veress needle introduced into the abdomen through this incision. Once the abdomen was accessed, it was confirmed using a saline drop test and the abdomen was insufflated. Once the abdomen was insufflated, additional robotic ports, 2 on the left and 1 on the right, were placed. A 12mm right-sided assistant port lateral to the robotic ports. Robot was then docked and surgery was commenced.   The sigmoid was adherent to the lateral pelvic sidewall and these adhesions were taken down sharply. A transverse incision was made posterior to the seminal vesicles. Denonvilliers' fascia was incised the rectum was mobilized way from the posterior prostate. The vas deferens on both sides was identified and divided with electrocautery and the seminal vesicles were carefully dissected out with the vasculature being controlled using Lapro clips. Once seminal vesicle and vas deferens were dissected all the way down to the prostate, attention was turned to bringing the bladder down off the anterior wall of the abdomen. Incisions were made lateral the medial umbilical ligaments and carried all the way up to the umbilicus and the urachus was divided horizontally. The bladder was mobilized away from the anterior abdominal wall and the the pelvic sidewalls on both sides. Endopelvic fascia was opened on each side and lateral prostate was dissected away from the levator musculature. An 0-Vicryl suture was placed to ligate the dorsal venous complex.  The anterior bladder neck was then incised at the base of  the prostate and dissection was carried through the anterior urethra to identify the Mendoza catheter. The Mendoza was then pulled out through this opening in the urethra to give upward traction on the prostate. Posterior bladder neck was then divided. There was no median lobe. Prostate was noted to be quite small, consistent with findings on pre-operative imaging. Attention was then turned to the prostatic pedicles. A bilateral nerve-sparing procedure was performed by sharply mobilizing the neurovascular bundles away from the posterior-lateral aspect of the prostate. The prostatic pedicles were then divided using clips on both sides all the way to the apex. The dorsal venous complex was then divided with cautery. The anterior urethra and apex of prostate were dissected free. Urethra was divided and prostate was free. It was placed in an endocatch bag.  Prostatic fossa was carefully inspected for hemostasis. Pelvic lymph node dissection was performed removing all kiran tissue between the external iliac vein, the pelvic floor, inguinal ligament up to the bifurcation of the common iliac vessels on both sides.    Upon evaluation of the bladder neck, it was noted to be quite thickened anteriorly. While this grossly appeared consistent with bladder neck fibers, additional tissue was resected and sent for margins from the anterior and posterior bladder neck, as the borders between prostate and bladder were grossly ambiguous. A final margin of bladder neck was sent for frozen section which was consistent with benign bladder neck fibers. Vesicourethral anastomosis was then performed in a standard fashion using two 3-0 V-lock sutures, the tail ends of which had been tied together. The first suture was taken at the 5 o'clock position through the bladder neck and taken through the posterior urethral plate and up the left side of the urethra and bladder to the 11 o'clock position. We then brought the right sided anastomotic suture  through the urethra and up the right side of the urethra and bladder to the midline. The sutures were then tied down. A final Mendoza catheter was then placed and the bladder irrigated.  The anastomosis was tested with saline and found it to be leak free. An EndoCatch bag was used to retrieve the specimen and a 19 Jacobo drain was placed in the pelvis. Robot was undocked and ports removed. Midline camera port incision was enlarged in a transverse fashion and the specimen retrieved. The fascia was reapproximated using interrupted figure-of-eight 0 Vicryl sutures on UR-6 needle. The skin of all the incisions was closed using running subcuticular closure with 4-0 Monocryl. Skin glue was placed.      The patient tolerated the procedure well. There were no complications. Blood loss was 50 mL. All sponge, needle and instrument counts were correct and doubly verified prior to closure. I was present and involved in the entire procedure.      Rohan Billy MD  Urology  [facility]

## 2024-09-20 NOTE — ANESTHESIA CARE TRANSFER NOTE
Patient: Jeff Maddox    Procedure: Procedure(s):  ROBOTIC-ASSISTED LAPAROSCOPIC RADICAL PROSTATECTOMY WITH PELVIC LYMPHADENECTOMY       Diagnosis: Prostate cancer (H) [C61]  Diagnosis Additional Information: No value filed.    Anesthesia Type:   General     Note:    Oropharynx: oropharynx clear of all foreign objects and spontaneously breathing  Level of Consciousness: drowsy  Oxygen Supplementation: face mask  Level of Supplemental Oxygen (L/min / FiO2): 8  Independent Airway: airway patency satisfactory and stable  Dentition: dentition unchanged  Vital Signs Stable: post-procedure vital signs reviewed and stable  Report to RN Given: handoff report given  Patient transferred to: PACU    Handoff Report: Identifed the Patient, Identified the Reponsible Provider, Reviewed the pertinent medical history, Discussed the surgical course, Reviewed Intra-OP anesthesia mangement and issues during anesthesia, Set expectations for post-procedure period and Allowed opportunity for questions and acknowledgement of understanding      Vitals:  Vitals Value Taken Time   /77 09/20/24 1245   Temp     Pulse 101 09/20/24 1248   Resp 18 09/20/24 1248   SpO2 98 % 09/20/24 1248   Vitals shown include unfiled device data.    Electronically Signed By: JASPER Menendez CRNA  September 20, 2024  12:50 PM

## 2024-09-20 NOTE — ANESTHESIA POSTPROCEDURE EVALUATION
Patient: Jfef Maddox    Procedure: Procedure(s):  ROBOTIC-ASSISTED LAPAROSCOPIC RADICAL PROSTATECTOMY WITH PELVIC LYMPHADENECTOMY       Anesthesia Type:  General    Note:  Disposition: Admission   Postop Pain Control: Uneventful            Sign Out: Well controlled pain   PONV: No   Neuro/Psych: Uneventful            Sign Out: Acceptable/Baseline neuro status   Airway/Respiratory: Uneventful            Sign Out: Acceptable/Baseline resp. status   CV/Hemodynamics: Uneventful            Sign Out: Acceptable CV status; No obvious hypovolemia; No obvious fluid overload   Other NRE: NONE   DID A NON-ROUTINE EVENT OCCUR? No           Last vitals:  Vitals Value Taken Time   /66 09/20/24 1345   Temp 36.1  C (97  F) 09/20/24 1345   Pulse 90 09/20/24 1359   Resp 22 09/20/24 1359   SpO2 97 % 09/20/24 1359   Vitals shown include unfiled device data.    Electronically Signed By: Milena Marmolejo MD  September 20, 2024  2:00 PM

## 2024-09-21 VITALS
SYSTOLIC BLOOD PRESSURE: 110 MMHG | HEIGHT: 69 IN | RESPIRATION RATE: 16 BRPM | TEMPERATURE: 98.3 F | BODY MASS INDEX: 24.82 KG/M2 | HEART RATE: 66 BPM | DIASTOLIC BLOOD PRESSURE: 76 MMHG | OXYGEN SATURATION: 96 % | WEIGHT: 167.6 LBS

## 2024-09-21 LAB
ANION GAP SERPL CALCULATED.3IONS-SCNC: 6 MMOL/L (ref 7–15)
BUN SERPL-MCNC: 13.4 MG/DL (ref 8–23)
CALCIUM SERPL-MCNC: 8.7 MG/DL (ref 8.8–10.4)
CHLORIDE SERPL-SCNC: 103 MMOL/L (ref 98–107)
CREAT SERPL-MCNC: 0.68 MG/DL (ref 0.67–1.17)
EGFRCR SERPLBLD CKD-EPI 2021: >90 ML/MIN/1.73M2
ERYTHROCYTE [DISTWIDTH] IN BLOOD BY AUTOMATED COUNT: 11.9 % (ref 10–15)
GLUCOSE SERPL-MCNC: 100 MG/DL (ref 70–99)
HCO3 SERPL-SCNC: 28 MMOL/L (ref 22–29)
HCT VFR BLD AUTO: 39.2 % (ref 40–53)
HGB BLD-MCNC: 13.2 G/DL (ref 13.3–17.7)
MCH RBC QN AUTO: 30.8 PG (ref 26.5–33)
MCHC RBC AUTO-ENTMCNC: 33.7 G/DL (ref 31.5–36.5)
MCV RBC AUTO: 92 FL (ref 78–100)
PLATELET # BLD AUTO: 226 10E3/UL (ref 150–450)
POTASSIUM SERPL-SCNC: 3.8 MMOL/L (ref 3.4–5.3)
RBC # BLD AUTO: 4.28 10E6/UL (ref 4.4–5.9)
SODIUM SERPL-SCNC: 137 MMOL/L (ref 135–145)
WBC # BLD AUTO: 14.8 10E3/UL (ref 4–11)

## 2024-09-21 PROCEDURE — 80048 BASIC METABOLIC PNL TOTAL CA: CPT | Performed by: UROLOGY

## 2024-09-21 PROCEDURE — 250N000011 HC RX IP 250 OP 636: Performed by: UROLOGY

## 2024-09-21 PROCEDURE — 36415 COLL VENOUS BLD VENIPUNCTURE: CPT | Performed by: UROLOGY

## 2024-09-21 PROCEDURE — 85027 COMPLETE CBC AUTOMATED: CPT | Performed by: UROLOGY

## 2024-09-21 PROCEDURE — 250N000013 HC RX MED GY IP 250 OP 250 PS 637: Performed by: UROLOGY

## 2024-09-21 RX ORDER — OXYCODONE HYDROCHLORIDE 5 MG/1
5 TABLET ORAL EVERY 6 HOURS PRN
Qty: 12 TABLET | Refills: 0 | Status: SHIPPED | OUTPATIENT
Start: 2024-09-21 | End: 2024-09-24

## 2024-09-21 RX ORDER — DOCUSATE SODIUM 100 MG/1
100 CAPSULE, LIQUID FILLED ORAL 2 TIMES DAILY PRN
Qty: 20 CAPSULE | Refills: 1 | Status: SHIPPED | OUTPATIENT
Start: 2024-09-21

## 2024-09-21 RX ADMIN — POLYETHYLENE GLYCOL 3350 17 G: 17 POWDER, FOR SOLUTION ORAL at 08:33

## 2024-09-21 RX ADMIN — KETOROLAC TROMETHAMINE 15 MG: 15 INJECTION, SOLUTION INTRAMUSCULAR; INTRAVENOUS at 08:33

## 2024-09-21 RX ADMIN — KETOROLAC TROMETHAMINE 15 MG: 15 INJECTION, SOLUTION INTRAMUSCULAR; INTRAVENOUS at 02:44

## 2024-09-21 RX ADMIN — ACETAMINOPHEN 975 MG: 325 TABLET ORAL at 06:02

## 2024-09-21 RX ADMIN — TOLTERODINE TARTRATE 4 MG: 2 CAPSULE, EXTENDED RELEASE ORAL at 08:33

## 2024-09-21 RX ADMIN — Medication 25 MCG: at 08:33

## 2024-09-21 RX ADMIN — BACITRACIN: 500 OINTMENT TOPICAL at 08:34

## 2024-09-21 RX ADMIN — SENNOSIDES AND DOCUSATE SODIUM 1 TABLET: 50; 8.6 TABLET ORAL at 08:33

## 2024-09-21 ASSESSMENT — ACTIVITIES OF DAILY LIVING (ADL)
ADLS_ACUITY_SCORE: 19
ADLS_ACUITY_SCORE: 22
ADLS_ACUITY_SCORE: 23
ADLS_ACUITY_SCORE: 22
ADLS_ACUITY_SCORE: 22
ADLS_ACUITY_SCORE: 19
ADLS_ACUITY_SCORE: 23
ADLS_ACUITY_SCORE: 22
ADLS_ACUITY_SCORE: 19
ADLS_ACUITY_SCORE: 23

## 2024-09-21 NOTE — PLAN OF CARE
Goal Outcome Evaluation:      Plan of Care Reviewed With: patient    Overall Patient Progress: improvingOverall Patient Progress: improving       Surgery: POD #1 Robot-assisted laparoscopic radical prostatectomy with pelvic lymphadenectomy  Behavior & Aggression: Green  Fall Risk: Yes  Orientation: A&Ox4  ABNL VS/O2: VSS on RA  Pain Management: Scheduled Tylenol, Toradol, & Ice packs  Bowel/Bladder: Mendoza in place- adequate UOP. BS hypoactive- reports passing gas  IV: PIV SL  Wounds/incisions: Abdominal lap sites AN & intact with liquid bandage. L RENATA drain with bright bloody output & dressing intact with scant dried drainage.  Diet: Regular- tolerating  Activity Level: Ax1 with gaitbelt- ambulated in halls this AM  Anticipated  DC Date: Pending

## 2024-09-21 NOTE — PROGRESS NOTES
[id number]. VSS. A/O. Up-1. Incision CDI. RENATA small drainage on dressing. Mendoza good UOP. Toradol/tylenol given. Tolerating diet.

## 2024-09-21 NOTE — PROGRESS NOTES
Urology    Tolerating regular diet  Passing flatus  Pain well controlled  Ambulated yesterday    A+O and in NAD  Abd S/NT/NF  Incisions C/D/I  Urine clear  RENATA with serosanguinous    A/P: Doing well  Discharge home if tolerates breakfast  RENATA our before discharge

## 2024-09-21 NOTE — PLAN OF CARE
Goal Outcome Evaluation:      Plan of Care Reviewed With: patient, spouse    Overall Patient Progress: improvingOverall Patient Progress: improving    Date & Time: 9/21/24 0680-1621  Behavior & Aggression: green  Fall Risk: no  Orientation:A&OX4  ABNL VS/O2:VSS, RA  Pain Management:pain controlled with tylenol and toradol  Bowel/Bladder: Mendoza patent with spencer urine, no clots.  Positive BS, passing flatus, no nausea, no BM yet  Drains: RENATA with bloody output, discontinued per order.   Wounds/incisions: abd incisions with liquid bandaid CDI.  Diet:Tolerating regular diet without nausea.   Activity Level: Up IND, steady on feet.  Anticipated  DC Date: today, 9/21 once meds here from pharmacy  Significant Information: Discharge instructions reviewed and well received by patient/wife.  Both demonstrated proper technique with leg bag cares.  Will discharge to home once meds here.

## 2024-09-24 ENCOUNTER — CARE COORDINATION (OUTPATIENT)
Dept: UROLOGY | Facility: CLINIC | Age: 69
End: 2024-09-24
Payer: COMMERCIAL

## 2024-09-24 LAB
PATH REPORT.COMMENTS IMP SPEC: ABNORMAL
PATH REPORT.COMMENTS IMP SPEC: ABNORMAL
PATH REPORT.COMMENTS IMP SPEC: YES
PATH REPORT.FINAL DX SPEC: ABNORMAL
PATH REPORT.GROSS SPEC: ABNORMAL
PATH REPORT.INTRAOP OBS SPEC DOC: ABNORMAL
PATH REPORT.MICROSCOPIC SPEC OTHER STN: ABNORMAL
PATH REPORT.RELEVANT HX SPEC: ABNORMAL
PATHOLOGY SYNOPTIC REPORT: ABNORMAL
PHOTO IMAGE: ABNORMAL

## 2024-09-24 PROCEDURE — 88307 TISSUE EXAM BY PATHOLOGIST: CPT | Mod: 26 | Performed by: PATHOLOGY

## 2024-09-24 PROCEDURE — 88309 TISSUE EXAM BY PATHOLOGIST: CPT | Mod: 26 | Performed by: PATHOLOGY

## 2024-09-24 PROCEDURE — 88305 TISSUE EXAM BY PATHOLOGIST: CPT | Mod: 26 | Performed by: PATHOLOGY

## 2024-09-24 PROCEDURE — 88331 PATH CONSLTJ SURG 1 BLK 1SPC: CPT | Mod: 26 | Performed by: PATHOLOGY

## 2024-09-24 NOTE — PROGRESS NOTES
Arvind Aguilar,     How are you doing after your surgical procedure on 9/20/24?     Any fever, chills, nausea or vomiting?  No  How is your appetite?  Good  Are you drinking enough fluids?  Yes  Have you had a bowel movement since you have been home?  Yes  Any problems with your catheter?  None  Is your urine clear yellow? If not, what color is it?  Clear  How does your incision look? Is there any drainage? What color is the drainage?  Look good, scant amount of serosanguinous drainage from incisions  Is there any redness, warmth or tenderness around the incision?  None  How is your pain? On a scale of 0-10, what number would you rate the pain?  1/10  What medications are you taking for your pain? Are you able to control the pain?  Rotating Tylenol and Ibuprofen q10h        Do you have any questions or concerns?     We have your post-operative appointment scheduled for 9/27/24 at 10:00am with one of our nurses at the [facility] to have your catheter removed.     We have your post-operative appointment scheduled for 10/3/24 at 11:30am with Dr. Billy at the [facility].     Please feel free to reply via MyChart or at the number below.        Alicia Barboza RN, BSN  Care Coordinator  [facility]  [phone]

## 2024-09-27 ENCOUNTER — ALLIED HEALTH/NURSE VISIT (OUTPATIENT)
Dept: UROLOGY | Facility: CLINIC | Age: 69
End: 2024-09-27
Payer: COMMERCIAL

## 2024-09-27 DIAGNOSIS — C61 PROSTATE CANCER (H): Primary | ICD-10-CM

## 2024-09-27 PROCEDURE — 99207 PR NO CHARGE NURSE ONLY: CPT

## 2024-09-27 RX ORDER — CIPROFLOXACIN 500 MG/1
500 TABLET, FILM COATED ORAL ONCE
Qty: 1 TABLET | Refills: 0 | Status: SHIPPED | OUTPATIENT
Start: 2024-09-27 | End: 2024-09-27

## 2024-09-27 NOTE — PROGRESS NOTES
Jeff Maddox comes into clinic today for Prostate Cancer  at the request of  Ordering Provider for Catheter Removal.  This service provided today was under the supervising provider of the lupe HUTCHINSON CNP , who was available if needed.    Catheter removal documentation on 9/27/2024:  1 cipro sent,kegal exercise sheet given.  Jeff Maddox presents to the clinic for catheter removal.  Reason for removal: scheduled removal  Order has been verified. yes  Catheter successfully removed at 11:00 AM without immediate complication.  Incisions clean and dry  Urethral meatus is free of secretions and encrustation.  The patient is afebrile.  The patient tolerated the procedure and was instructed to follow up as planned next week    Rosalind Weston LPN

## 2024-10-03 ENCOUNTER — OFFICE VISIT (OUTPATIENT)
Dept: UROLOGY | Facility: CLINIC | Age: 69
End: 2024-10-03
Payer: COMMERCIAL

## 2024-10-03 VITALS
DIASTOLIC BLOOD PRESSURE: 77 MMHG | OXYGEN SATURATION: 99 % | HEART RATE: 51 BPM | BODY MASS INDEX: 24.88 KG/M2 | WEIGHT: 168 LBS | SYSTOLIC BLOOD PRESSURE: 120 MMHG | HEIGHT: 69 IN

## 2024-10-03 DIAGNOSIS — C61 PROSTATE CANCER (H): Primary | ICD-10-CM

## 2024-10-03 PROCEDURE — 99024 POSTOP FOLLOW-UP VISIT: CPT | Performed by: UROLOGY

## 2024-10-03 ASSESSMENT — PAIN SCALES - GENERAL: PAINLEVEL: NO PAIN (0)

## 2024-10-03 NOTE — Clinical Note
10/3/2024       RE: Jeff Maddox  [address]   [place] [address]     Dear Colleague,    Thank you for referring your patient, Jeff Maddox, to the [facility] [place] at [facility]. Please see a copy of my visit note below.      CHIEF COMPLAINT   It was my pleasure to see Jeff Maddox who is a 69 year old male for follow-up of Prostate Cancer.      HPI   Jeff Madodx is a very pleasant 69 year old male who presents with a history of {Diagnoses:338459}.  ***    RALP 9/20/2024  SPECIMEN   Procedure  Radical prostatectomy   TUMOR   Histologic Type  Acinar adenocarcinoma, conventional (usual)   Histologic Grade     Grade  Grade group 3 (Fatuma Score 4 + 3 = 7)   Percentage of Pattern 4  71 - 80%   Intraductal Carcinoma (IDC)  Not identified   Cribriform Glands  Present   Treatment Effect  No known presurgical therapy   TUMOR QUANTITATION     Estimated Percentage of Prostate Involved by Tumor  11 - 20%   Extraprostatic Extension (EPE)  Not identified   Urinary Bladder Neck Invasion  Not identified   Seminal Vesicle Invasion  Not identified   Lymphatic and / or Vascular Invasion  Not Identified   Perineural Invasion  Present   MARGINS   Margin Status  Invasive carcinoma present at margin   Linear Length of Margin(s) Involved by Carcinoma  0.5 mm   Focality of Margin Involvement  Unifocal   Margin(s) Involved by Invasive Carcinoma  Right posterior   REGIONAL LYMPH NODES   Regional Lymph Node Status  All regional lymph nodes negative for tumor   Number of Lymph Nodes Examined  6   pTNM CLASSIFICATION (AJCC 8th Edition)   Reporting of pT, pN, and (when applicable) pM categories is based on information available to the pathologist at the time the report is issued. As per the AJCC (Chapter 1, 8th Ed.) it is the managing physician s responsibility to establish the final pathologic stage based upon all pertinent information, including but  "potentially not limited to this pathology report.   pT Category  pT2   pN Category  pN0       PHYSICAL EXAM  Patient is a 69 year old  male   Vitals: Blood pressure 120/77, pulse 51, height 1.753 m (5' 9\"), weight 76.2 kg (168 lb), SpO2 99%.  General Appearance Adult: Body mass index is 24.81 kg/m .  Alert, no acute distress, oriented  Lungs: no respiratory distress, or pursed lip breathing  Abdomen: soft, nontender, no organomegaly or masses; ***  Back: *** CVAT  Neuro: Alert, oriented, speech and mentation normal  Psych: affect and mood normal  : {FRANCISCO EXAM MALE GENITAL:553231}    ASSESSMENT and PLAN  69 year old male with stage pT2N0, [place] 4+3=7 prostate cancer, s/p RALP completed 9/20/2024. Focal positive margin. Recovering well. Mendoza removed last week. Discussed expectations for recovery and ongoing activity restrictions. We discussed his pathology in detail today. We discussed the importance of ongoing PSA surveillance and risk of recurrence.      - Pelvic floor PT referral  - Follow-up 3 months with PSA        *** minutes spent on the date of the encounter doing chart review, history and exam, documentation and further activities as noted above.    Rohan Billy MD  Urology  [facility]        Again, thank you for allowing me to participate in the care of your patient.      Sincerely,    Rohan Billy MD    "

## 2024-10-03 NOTE — PROGRESS NOTES
"  CHIEF COMPLAINT   It was my pleasure to see Jeff Maddox who is a 69 year old male for follow-up of Prostate Cancer.      HPI   Jeff Maddox is a very pleasant 69 year old male who presents with a history of Prostate Cancer. RALP 9/20/2024. Fatuma 4+3=7, stage pT2N0. No complications from surgery.     RALP 9/20/2024  SPECIMEN   Procedure  Radical prostatectomy   TUMOR   Histologic Type  Acinar adenocarcinoma, conventional (usual)   Histologic Grade     Grade  Grade group 3 (Fatuma Score 4 + 3 = 7)   Percentage of Pattern 4  71 - 80%   Intraductal Carcinoma (IDC)  Not identified   Cribriform Glands  Present   Treatment Effect  No known presurgical therapy   TUMOR QUANTITATION     Estimated Percentage of Prostate Involved by Tumor  11 - 20%   Extraprostatic Extension (EPE)  Not identified   Urinary Bladder Neck Invasion  Not identified   Seminal Vesicle Invasion  Not identified   Lymphatic and / or Vascular Invasion  Not Identified   Perineural Invasion  Present   MARGINS   Margin Status  Invasive carcinoma present at margin   Linear Length of Margin(s) Involved by Carcinoma  0.5 mm   Focality of Margin Involvement  Unifocal   Margin(s) Involved by Invasive Carcinoma  Right posterior   REGIONAL LYMPH NODES   Regional Lymph Node Status  All regional lymph nodes negative for tumor   Number of Lymph Nodes Examined  6   pTNM CLASSIFICATION (AJCC 8th Edition)   Reporting of pT, pN, and (when applicable) pM categories is based on information available to the pathologist at the time the report is issued. As per the AJCC (Chapter 1, 8th Ed.) it is the managing physician s responsibility to establish the final pathologic stage based upon all pertinent information, including but potentially not limited to this pathology report.   pT Category  pT2   pN Category  pN0     PHYSICAL EXAM  Patient is a 69 year old  male   Vitals: Blood pressure 120/77, pulse 51, height 1.753 m (5' 9\"), weight 76.2 kg (168 lb), SpO2 " 99%.  General Appearance Adult: Body mass index is 24.81 kg/m .  Alert, no acute distress, oriented  Lungs: no respiratory distress, or pursed lip breathing  Abdomen: soft, nontender, no organomegaly or masses  Incisions healing well  Back: no CVAT  Neuro: Alert, oriented, speech and mentation normal  Psych: affect and mood normal    ASSESSMENT and PLAN  69 year old male with stage pT2N0, Fatuma 4+3=7 prostate cancer, s/p RALP completed 9/20/2024. Focal positive margin. Recovering well. Mendoza removed last week. Discussed expectations for recovery and ongoing activity restrictions. We discussed his pathology in detail today. We discussed the importance of ongoing PSA surveillance and risk of recurrence.      - Pelvic floor PT referral  - Follow-up 3 months with PSA    15 minutes spent on the date of the encounter doing chart review, history and exam, documentation and further activities as noted above.    Rohan Billy MD  Urology  [facility]

## 2024-10-03 NOTE — NURSING NOTE
Chief Complaint   Patient presents with    Prostate Cancer     Post op prostatectomy    Katelynn Horan LPN

## 2024-12-16 ENCOUNTER — TELEPHONE (OUTPATIENT)
Dept: ONCOLOGY | Facility: CLINIC | Age: 69
End: 2024-12-16
Payer: COMMERCIAL

## 2024-12-16 NOTE — TELEPHONE ENCOUNTER
[facility] Call Center    Phone Message    May a detailed message be left on voicemail: yes     Reason for Call: Other: Pt needing PSA labs sent to his clinic [facility]. Pt had an appt today 12/16/24 and was turned away as the orders were never sent. Pt would like a call when orders are sent so he knows he can set up another appt      Action Taken: Other: uro    Travel Screening: Not Applicable     Date of Service:

## 2025-01-02 ENCOUNTER — VIRTUAL VISIT (OUTPATIENT)
Dept: UROLOGY | Facility: CLINIC | Age: 70
End: 2025-01-02
Payer: COMMERCIAL

## 2025-01-02 DIAGNOSIS — C61 PROSTATE CANCER (H): Primary | ICD-10-CM

## 2025-01-02 NOTE — NURSING NOTE
Current patient location: [place]    Is the patient currently in the state of [place]? YES    Visit mode:VIDEO    If the visit is dropped, the patient can be reconnected by:VIDEO VISIT: Send to e-mail at: [email or web address]    Will anyone else be joining the visit? NO  (If patient encounters technical issues they should call [phone]    Are changes needed to the allergy or medication list? No and Pt stated no med changes  .  Are refills needed on medications prescribed by this physician? Discuss with provider    Rooming Documentation:  Patient noted issues with stomach cramping that is getting better.     Reason for visit: RECHECK (Follow up )    Kristan WARNER

## 2025-01-02 NOTE — Clinical Note
"1/2/2025       RE: Jeff Maddox  [address]   [place] [address]     Dear Colleague,    Thank you for referring your patient, Jeff Maddox, to the [facility] [place] at [facility]. Please see a copy of my visit note below.    Virtual Visit Details    Type of service:  Video Visit   Video Start Time: {video visit start/end time for provider to select:433787}  Video End Time:{video visit start/end time for provider to select:197588}    Originating Location (pt. Location): {video visit patient location:789843::\"Home\"}  {PROVIDER LOCATION On-site should be selected for visits conducted from your clinic location or adjoining [facility] hospital, academic office, or other nearby [facility] building. Off-site should be selected for all other provider locations, including home:028331}  Distant Location (provider location):  {virtual location provider:929684}  Platform used for Video Visit: {Virtual Visit Platforms:306511::\"Efficas\"}      Jeff Maddox is a 69 year old male who is being evaluated via a billable video visit.      How would you like to obtain your AVS? MyChart  If the video visit is dropped, the invitation should be resent by: Text to cell phone: [phone]  Will anyone else be joining your video visit? No    Video Start Time: 9:45 AM    CHIEF COMPLAINT   It was my pleasure to see Jeff Maddox who is a 69 year old male for follow-up of Prostate Cancer.      HPI  Jeff Maddox is a very pleasant 69 year old male who presents with a history of Prostate Cancer. RALP 9/20/2024. Fatuma 4+3=7, stage pT2N0. No complications from surgery.     PSA  12/27/2024 - <0.1     RALP 9/20/2024       SPECIMEN   Procedure   Radical prostatectomy   TUMOR   Histologic Type   Acinar adenocarcinoma, conventional (usual)   Histologic Grade       Grade   Grade group 3 (Fatuma Score 4 + 3 = 7)   Percentage of Pattern 4   71 - 80%   Intraductal Carcinoma (IDC)   Not " identified   Cribriform Glands   Present   Treatment Effect   No known presurgical therapy   TUMOR QUANTITATION       Estimated Percentage of Prostate Involved by Tumor   11 - 20%   Extraprostatic Extension (EPE)   Not identified   Urinary Bladder Neck Invasion   Not identified   Seminal Vesicle Invasion   Not identified   Lymphatic and / or Vascular Invasion   Not Identified   Perineural Invasion   Present   MARGINS   Margin Status   Invasive carcinoma present at margin   Linear Length of Margin(s) Involved by Carcinoma   0.5 mm   Focality of Margin Involvement   Unifocal   Margin(s) Involved by Invasive Carcinoma   Right posterior   REGIONAL LYMPH NODES   Regional Lymph Node Status   All regional lymph nodes negative for tumor   Number of Lymph Nodes Examined   6   pTNM CLASSIFICATION (AJCC 8th Edition)   Reporting of pT, pN, and (when applicable) pM categories is based on information available to the pathologist at the time the report is issued. As per the AJCC (Chapter 1, 8th Ed.) it is the managing physician s responsibility to establish the final pathologic stage based upon all pertinent information, including but potentially not limited to this pathology report.   pT Category   pT2   pN Category   pN0          Allergies:   Patient has no known allergies.         Review of Systems:  From intake questionnaire     Skin: negative  Eyes: negative  Ears/Nose/Throat: negative  Respiratory: No shortness of breath, dyspnea on exertion, cough, or hemoptysis  Cardiovascular: No chest pain or palpitations  Gastrointestinal: negative; no nausea/vomiting, constipation or diarrhea  Genitourinary: as per HPI  Musculoskeletal: negative  Neurologic: negative  Psychiatric: negative  Hematologic/Lymphatic/Immunologic: negative  Endocrine: negative         Physical Exam:     Vitals:  No vitals were obtained today due to virtual visit.    Physical Exam   EYES: Eyes grossly normal to inspection.  No discharge or erythema, or  obvious scleral/conjunctival abnormalities.  SKIN: Visible skin clear. No significant rash, abnormal pigmentation or lesions.  NEURO: Cranial nerves grossly intact.  Mentation and speech appropriate for age.  GENERAL: Healthy, alert and no distress  RESP: No audible wheeze, cough, or visible cyanosis.  No visible retractions or increased work of breathing.    PSYCH: Mentation appears normal, affect normal/bright, judgement and insight intact, normal speech and appearance well-groomed.      Outside and Past Medical records:    Review of the result(s) of each unique test - PSA         Assessment and Plan:     69 year old male with stage pT2N0, Fatuma 4+3=7 prostate cancer, s/p RALP completed 9/20/2024. Focal positive margin. PSA undetectable.     - Follow-up 3 months with PSA    Orders  Orders Placed This Encounter   Procedures     PSA tumor marker [[id number]]     Video-Visit Details    Type of service:  Video Visit    Video End Time:{video visit start/end time for provider to select:020385}    Originating Location (pt. Location): Home    Distant Location (provider location):  On-site    Platform used for Video Visit: Sitedesk    *** minutes spent on the date of the encounter including direct interaction with the patient, performing chart review, history and exam, documentation and further activities as noted above.    Rohan Billy MD  Urology  [facility]          Again, thank you for allowing me to participate in the care of your patient.      Sincerely,    Rohan Billy MD

## 2025-01-02 NOTE — PROGRESS NOTES
Jeff Maddox is a 69 year old male who is being evaluated via a billable video visit.      How would you like to obtain your AVS? MyChart  If the video visit is dropped, the invitation should be resent by: Text to cell phone: [phone]  Will anyone else be joining your video visit? No    Video Start Time: 9:45 AM    CHIEF COMPLAINT   It was my pleasure to see Jeff Maddox who is a 69 year old male for follow-up of Prostate Cancer.      HPI  Jeff Maddox is a very pleasant 69 year old male who presents with a history of Prostate Cancer. RALP 9/20/2024. [place] 4+3=7, stage pT2N0. No complications from surgery. Excellent return of continence. Not wearing pads. Seeing return of erectile function.    PSA  12/27/2024 - <0.1     RALP 9/20/2024       SPECIMEN   Procedure   Radical prostatectomy   TUMOR   Histologic Type   Acinar adenocarcinoma, conventional (usual)   Histologic Grade       Grade   Grade group 3 (Fatuma Score 4 + 3 = 7)   Percentage of Pattern 4   71 - 80%   Intraductal Carcinoma (IDC)   Not identified   Cribriform Glands   Present   Treatment Effect   No known presurgical therapy   TUMOR QUANTITATION       Estimated Percentage of Prostate Involved by Tumor   11 - 20%   Extraprostatic Extension (EPE)   Not identified   Urinary Bladder Neck Invasion   Not identified   Seminal Vesicle Invasion   Not identified   Lymphatic and / or Vascular Invasion   Not Identified   Perineural Invasion   Present   MARGINS   Margin Status   Invasive carcinoma present at margin   Linear Length of Margin(s) Involved by Carcinoma   0.5 mm   Focality of Margin Involvement   Unifocal   Margin(s) Involved by Invasive Carcinoma   Right posterior   REGIONAL LYMPH NODES   Regional Lymph Node Status   All regional lymph nodes negative for tumor   Number of Lymph Nodes Examined   6   pTNM CLASSIFICATION (AJCC 8th Edition)   Reporting of pT, pN, and (when applicable) pM categories is based on information available to the  pathologist at the time the report is issued. As per the AJCC (Chapter 1, 8th Ed.) it is the managing physician s responsibility to establish the final pathologic stage based upon all pertinent information, including but potentially not limited to this pathology report.   pT Category   pT2   pN Category   pN0          Allergies:   Patient has no known allergies.         Review of Systems:  From intake questionnaire     Skin: negative  Eyes: negative  Ears/Nose/Throat: negative  Respiratory: No shortness of breath, dyspnea on exertion, cough, or hemoptysis  Cardiovascular: No chest pain or palpitations  Gastrointestinal: negative; no nausea/vomiting, constipation or diarrhea  Genitourinary: as per HPI  Musculoskeletal: negative  Neurologic: negative  Psychiatric: negative  Hematologic/Lymphatic/Immunologic: negative  Endocrine: negative         Physical Exam:     Vitals:  No vitals were obtained today due to virtual visit.    Physical Exam   EYES: Eyes grossly normal to inspection.  No discharge or erythema, or obvious scleral/conjunctival abnormalities.  SKIN: Visible skin clear. No significant rash, abnormal pigmentation or lesions.  NEURO: Cranial nerves grossly intact.  Mentation and speech appropriate for age.  GENERAL: Healthy, alert and no distress  RESP: No audible wheeze, cough, or visible cyanosis.  No visible retractions or increased work of breathing.    PSYCH: Mentation appears normal, affect normal/bright, judgement and insight intact, normal speech and appearance well-groomed.      Outside and Past Medical records:    Review of the result(s) of each unique test - PSA         Assessment and Plan:     69 year old male with stage pT2N0, Fatuma 4+3=7 prostate cancer, s/p RALP completed 9/20/2024. Focal positive margin. PSA undetectable. Good return of continence and erectile function.     - Follow-up 3 months with PSA    Orders  Orders Placed This Encounter   Procedures    PSA tumor marker [[id number]]      Video-Visit Details    Type of service:  Video Visit    Video End Time:9:54 AM    Originating Location (pt. Location): Home    Distant Location (provider location):  On-site    Platform used for Video Visit: AmWell    10 minutes spent on the date of the encounter including direct interaction with the patient, performing chart review, history and exam, documentation and further activities as noted above.    Rohan Billy MD  Urology  [facility]

## 2025-06-25 ENCOUNTER — TELEPHONE (OUTPATIENT)
Dept: UROLOGY | Facility: CLINIC | Age: 70
End: 2025-06-25
Payer: COMMERCIAL

## 2025-06-25 NOTE — TELEPHONE ENCOUNTER
[facility] Call Center    Phone Message    May a detailed message be left on voicemail: yes     Reason for Call: Order(s): Other:   Reason for requested: Patient has requested we sent his PSA lab orders to the [facility] in [place]. FX #[phone]  Date needed: asap  Provider name: Mariajose    Action Taken: Other: [facility]    Travel Screening: Not Applicable     Date of Service:

## 2025-06-25 NOTE — TELEPHONE ENCOUNTER
Informed patient that we faxed PSA orders to [facility] in [place] at [phone].  Patient states understanding.    Alicia Barboza, RN, BSN  Urology Care Coordinator  [facility]  [phone]

## 2025-07-10 ENCOUNTER — OFFICE VISIT (OUTPATIENT)
Dept: UROLOGY | Facility: CLINIC | Age: 70
End: 2025-07-10
Payer: COMMERCIAL

## 2025-07-10 VITALS — SYSTOLIC BLOOD PRESSURE: 122 MMHG | HEART RATE: 48 BPM | DIASTOLIC BLOOD PRESSURE: 74 MMHG | OXYGEN SATURATION: 96 %

## 2025-07-10 DIAGNOSIS — N52.31 ERECTILE DYSFUNCTION AFTER RADICAL PROSTATECTOMY: ICD-10-CM

## 2025-07-10 DIAGNOSIS — C61 PROSTATE CANCER (H): Primary | ICD-10-CM

## 2025-07-10 RX ORDER — SILDENAFIL 100 MG/1
100 TABLET, FILM COATED ORAL DAILY PRN
Qty: 20 TABLET | Refills: 4 | Status: SHIPPED | OUTPATIENT
Start: 2025-07-10

## 2025-07-10 NOTE — PROGRESS NOTES
CHIEF COMPLAINT   It was my pleasure to see Jeff Maddox who is a 70 year old male for follow-up of Prostate Cancer.      HPI  Jeff Maddox is a very pleasant 70 year old male who presents with a history of Prostate Cancer. RALP 9/20/2024. Fatuma 4+3=7, stage pT2N0. No complications from surgery. Excellent return of continence. Not wearing pads. Seeing return of erectile function, but not yet to baseline.     PSA  6/26/2025 - <0.1   4/3/2025 - <0.1  12/27/2024 - <0.1     RALP 9/20/2024          SPECIMEN   Procedure   Radical prostatectomy   TUMOR   Histologic Type   Acinar adenocarcinoma, conventional (usual)   Histologic Grade       Grade   Grade group 3 (Fatuma Score 4 + 3 = 7)   Percentage of Pattern 4   71 - 80%   Intraductal Carcinoma (IDC)   Not identified   Cribriform Glands   Present   Treatment Effect   No known presurgical therapy   TUMOR QUANTITATION       Estimated Percentage of Prostate Involved by Tumor   11 - 20%   Extraprostatic Extension (EPE)   Not identified   Urinary Bladder Neck Invasion   Not identified   Seminal Vesicle Invasion   Not identified   Lymphatic and / or Vascular Invasion   Not Identified   Perineural Invasion   Present   MARGINS   Margin Status   Invasive carcinoma present at margin   Linear Length of Margin(s) Involved by Carcinoma   0.5 mm   Focality of Margin Involvement   Unifocal   Margin(s) Involved by Invasive Carcinoma   Right posterior   REGIONAL LYMPH NODES   Regional Lymph Node Status   All regional lymph nodes negative for tumor   Number of Lymph Nodes Examined   6   pTNM CLASSIFICATION (AJCC 8th Edition)   Reporting of pT, pN, and (when applicable) pM categories is based on information available to the pathologist at the time the report is issued. As per the AJCC (Chapter 1, 8th Ed.) it is the managing physician s responsibility to establish the final pathologic stage based upon all pertinent information, including but potentially not limited to this pathology  report.   pT Category   pT2   pN Category   pN0     PHYSICAL EXAM  Patient is a 70 year old  male   Vitals: Blood pressure 122/74, pulse (!) 48, SpO2 96%.  General Appearance Adult: There is no height or weight on file to calculate BMI.  Alert, no acute distress, oriented  Lungs: no respiratory distress, or pursed lip breathing  Abdomen: soft, nontender, no organomegaly or masses  Back: no CVAT  Neuro: Alert, oriented, speech and mentation normal  Psych: affect and mood normal    Outside and Past Medical records:  Review of the result(s) of each unique test - PSA     ASSESSMENT and PLAN  70 year old male with stage pT2N0, Fatuma 4+3=7 prostate cancer, s/p RALP completed 9/20/2024. Focal positive margin. PSA undetectable. Good return of continence and erectile function but not yet to baseline. Sildenafil renewed today. Overall doing well.     - Follow-up 3 months with PSA    10 minutes spent on the date of the encounter doing chart review, history and exam, documentation and further activities as noted above.    Rohan Billy MD  Urology  [facility]

## 2025-07-10 NOTE — Clinical Note
7/10/2025       RE: Jeff Maddox  [address]  [place] [address]     Dear Colleague,    Thank you for referring your patient, Jeff Maddox, to the [facility]OLOGY [facility] [place] at [facility]. Please see a copy of my visit note below.      CHIEF COMPLAINT   It was my pleasure to see Jeff Maddox who is a 70 year old male for follow-up of Prostate Cancer.      HPI  Jeff Maddox is a very pleasant 70 year old male who presents with a history of Prostate Cancer. RALP 9/20/2024. Fat[place] 4+3=7, stage pT2N0. No complications from surgery. Excellent return of continence. Not wearing pads. Seeing return of erectile function, but not yet to baseline.     PSA  6/26/2025 - <0.1   4/3/2025 - <0.1  12/27/2024 - <0.1     RALP 9/20/2024          SPECIMEN   Procedure   Radical prostatectomy   TUMOR   Histologic Type   Acinar adenocarcinoma, conventional (usual)   Histologic Grade       Grade   Grade group 3 (Fatuma Score 4 + 3 = 7)   Percentage of Pattern 4   71 - 80%   Intraductal Carcinoma (IDC)   Not identified   Cribriform Glands   Present   Treatment Effect   No known presurgical therapy   TUMOR QUANTITATION       Estimated Percentage of Prostate Involved by Tumor   11 - 20%   Extraprostatic Extension (EPE)   Not identified   Urinary Bladder Neck Invasion   Not identified   Seminal Vesicle Invasion   Not identified   Lymphatic and / or Vascular Invasion   Not Identified   Perineural Invasion   Present   MARGINS   Margin Status   Invasive carcinoma present at margin   Linear Length of Margin(s) Involved by Carcinoma   0.5 mm   Focality of Margin Involvement   Unifocal   Margin(s) Involved by Invasive Carcinoma   Right posterior   REGIONAL LYMPH NODES   Regional Lymph Node Status   All regional lymph nodes negative for tumor   Number of Lymph Nodes Examined   6   pTNM CLASSIFICATION (AJCC 8th Edition)   Reporting of pT, pN, and (when applicable) pM  categories is based on information available to the pathologist at the time the report is issued. As per the AJCC (Chapter 1, 8th Ed.) it is the managing physician s responsibility to establish the final pathologic stage based upon all pertinent information, including but potentially not limited to this pathology report.   pT Category   pT2   pN Category   pN0     PHYSICAL EXAM  Patient is a 70 year old  male   Vitals: Blood pressure 122/74, pulse (!) 48, SpO2 96%.  General Appearance Adult: There is no height or weight on file to calculate BMI.  Alert, no acute distress, oriented  Lungs: no respiratory distress, or pursed lip breathing  Abdomen: soft, nontender, no organomegaly or masses  Back: no CVAT  Neuro: Alert, oriented, speech and mentation normal  Psych: affect and mood normal    Outside and Past Medical records:  Review of the result(s) of each unique test - PSA     ASSESSMENT and PLAN  70 year old male with stage pT2N0, Fatuma 4+3=7 prostate cancer, s/p RALP completed 9/20/2024. Focal positive margin. PSA undetectable. Good return of continence and erectile function but not yet to baseline. Overall doing well.     - Follow-up 3 months with PSA    *** minutes spent on the date of the encounter doing chart review, history and exam, documentation and further activities as noted above.    Rohan Billy MD  Urology  [facility]        Again, thank you for allowing me to participate in the care of your patient.      Sincerely,    Rohan Billy MD

## 2025-07-12 ENCOUNTER — HEALTH MAINTENANCE LETTER (OUTPATIENT)
Age: 70
End: 2025-07-12

## (undated) DEVICE — LINEN TOWEL PACK X5 5464

## (undated) DEVICE — SOL NACL 0.9% INJ 1000ML BAG 2B1324X

## (undated) DEVICE — SPONGE RAY-TEC 4X8" 7318

## (undated) DEVICE — DRAIN JACKSON PRATT CHANNEL 19FR ROUND HUBLESS SIL JP-2230

## (undated) DEVICE — CLEANER INST PRE-KLENZ SOAK SHIELD TUBE 6 ML MEDIUM 2D66J4

## (undated) DEVICE — PACK DAVINCI UROLOGY SBA15UDFSG

## (undated) DEVICE — SU VICRYL+ 0 27 UR6 VLT VCP603H

## (undated) DEVICE — DECANTER VIAL 2006S

## (undated) DEVICE — DAVINCI HOT SHEARS TIP COVER  400180

## (undated) DEVICE — ENDO POUCH UNIV RETRIEVAL SYSTEM INZII 10MM CD001

## (undated) DEVICE — SU MONOCRYL 4-0 PS-2 18" UND Y496G

## (undated) DEVICE — CLIP ENDO HEMO-LOC PURPLE LG 544240

## (undated) DEVICE — SUCTION IRR STRYKERFLOW II W/TIP 250-070-520

## (undated) DEVICE — DAVINCI XI DRAPE ARM 470015

## (undated) DEVICE — ANTIFOG SOLUTION SEE SHARP 150M TROCAR SWABS 30978

## (undated) DEVICE — DRAIN JACKSON PRATT RESERVOIR 100ML SU130-1305

## (undated) DEVICE — DAVINCI XI GRASPER ENDOWRIST PROGRASP 470093

## (undated) DEVICE — GLOVE BIOGEL PI SZ 6.5 40865

## (undated) DEVICE — GOWN XXL 9575

## (undated) DEVICE — SU VICRYL 4-0 RB-1 27" J304

## (undated) DEVICE — ENDO TROCAR CONMED AIRSEAL BLADELESS 12X120MM IAS12-120LP

## (undated) DEVICE — DAVINCI XI DRAPE COLUMN 470341

## (undated) DEVICE — TUBING FILTER TRI-LUMEN AIRSEAL ASC-EVAC1

## (undated) DEVICE — PROTECTOR ARM ONE-STEP TRENDELENBURG 40418

## (undated) DEVICE — DAVINCI XI ESU FCP BIPOLAR MARYLAND 470172

## (undated) DEVICE — GLOVE BIOGEL PI ULTRATOUCH G SZ 7.5 42175

## (undated) DEVICE — SOL WATER IRRIG 1000ML BOTTLE 2F7114

## (undated) DEVICE — DAVINCI XI SEAL UNIVERSAL 5-12MM 470500

## (undated) DEVICE — DAVINCI XI NDL DRIVER LARGE 470006

## (undated) DEVICE — MANIFOLD NEPTUNE 4 PORT 700-20

## (undated) DEVICE — NDL INSUFFLATION 13GA 120MM C2201

## (undated) DEVICE — SU WND CLOSURE VLOC 90 ABS 3-0 VIOLET 6" CV-23 VLOCM0804

## (undated) DEVICE — SU SILK 2-0 FSL 18" 677G

## (undated) DEVICE — SOL NACL 0.9% IRRIG 1000ML BOTTLE 2F7124

## (undated) DEVICE — SU VICRYL 0 CT-1 27" J340H

## (undated) DEVICE — KIT PATIENT POSITIONING PIGAZZI LATEX FREE 40580

## (undated) DEVICE — CATH FOLEY COUDE 18FR 5ML LATEX

## (undated) DEVICE — GLOVE SENSICARE PI ORTHO PF 6.5 LATEX FREE MSG9465

## (undated) DEVICE — ESU GROUND PAD UNIVERSAL W/O CORD

## (undated) DEVICE — DAVINCI XI MONOPOLAR SCISSORS HOT SHEARS 8MM 470179

## (undated) DEVICE — TUBING CONMED AIRSEAL SMOKE EVAC INSUFFLATION ASM-EVAC

## (undated) DEVICE — CATH FOLEY 18FR 5ML LATEX 0165SI18

## (undated) RX ORDER — ONDANSETRON 2 MG/ML
INJECTION INTRAMUSCULAR; INTRAVENOUS
Status: DISPENSED
Start: 2024-09-20

## (undated) RX ORDER — FENTANYL CITRATE 50 UG/ML
INJECTION, SOLUTION INTRAMUSCULAR; INTRAVENOUS
Status: DISPENSED
Start: 2024-09-20

## (undated) RX ORDER — HEPARIN SODIUM 5000 [USP'U]/.5ML
INJECTION, SOLUTION INTRAVENOUS; SUBCUTANEOUS
Status: DISPENSED
Start: 2024-09-20

## (undated) RX ORDER — PROPOFOL 10 MG/ML
INJECTION, EMULSION INTRAVENOUS
Status: DISPENSED
Start: 2024-09-20

## (undated) RX ORDER — CEFAZOLIN SODIUM/WATER 2 G/20 ML
SYRINGE (ML) INTRAVENOUS
Status: DISPENSED
Start: 2024-09-20

## (undated) RX ORDER — DEXAMETHASONE SODIUM PHOSPHATE 4 MG/ML
INJECTION, SOLUTION INTRA-ARTICULAR; INTRALESIONAL; INTRAMUSCULAR; INTRAVENOUS; SOFT TISSUE
Status: DISPENSED
Start: 2024-09-20

## (undated) RX ORDER — BUPIVACAINE HYDROCHLORIDE 2.5 MG/ML
INJECTION, SOLUTION EPIDURAL; INFILTRATION; INTRACAUDAL
Status: DISPENSED
Start: 2024-09-20

## (undated) RX ORDER — HYDROMORPHONE HYDROCHLORIDE 1 MG/ML
INJECTION, SOLUTION INTRAMUSCULAR; INTRAVENOUS; SUBCUTANEOUS
Status: DISPENSED
Start: 2024-09-20